# Patient Record
Sex: FEMALE | Race: WHITE | Employment: UNEMPLOYED | URBAN - NONMETROPOLITAN AREA
[De-identification: names, ages, dates, MRNs, and addresses within clinical notes are randomized per-mention and may not be internally consistent; named-entity substitution may affect disease eponyms.]

---

## 2017-04-24 ENCOUNTER — OFFICE VISIT (OUTPATIENT)
Dept: NEUROLOGY | Age: 32
End: 2017-04-24
Payer: COMMERCIAL

## 2017-04-24 VITALS
HEIGHT: 66 IN | BODY MASS INDEX: 27.64 KG/M2 | SYSTOLIC BLOOD PRESSURE: 112 MMHG | WEIGHT: 172 LBS | DIASTOLIC BLOOD PRESSURE: 72 MMHG

## 2017-04-24 DIAGNOSIS — M79.604 LEG PAIN, BILATERAL: Primary | ICD-10-CM

## 2017-04-24 DIAGNOSIS — G72.3 HYPOKALEMIC PERIODIC PARALYSIS: ICD-10-CM

## 2017-04-24 DIAGNOSIS — M79.605 LEG PAIN, BILATERAL: Primary | ICD-10-CM

## 2017-04-24 DIAGNOSIS — G89.29 CHRONIC LOW BACK PAIN, UNSPECIFIED BACK PAIN LATERALITY, WITH SCIATICA PRESENCE UNSPECIFIED: ICD-10-CM

## 2017-04-24 DIAGNOSIS — M54.5 CHRONIC LOW BACK PAIN, UNSPECIFIED BACK PAIN LATERALITY, WITH SCIATICA PRESENCE UNSPECIFIED: ICD-10-CM

## 2017-04-24 PROCEDURE — 99214 OFFICE O/P EST MOD 30 MIN: CPT | Performed by: PSYCHIATRY & NEUROLOGY

## 2017-04-24 RX ORDER — POTASSIUM CHLORIDE 20 MEQ/1
TABLET, EXTENDED RELEASE ORAL
Refills: 2 | COMMUNITY
Start: 2017-02-21

## 2017-04-24 RX ORDER — PREGABALIN 50 MG/1
CAPSULE ORAL
Qty: 42 CAPSULE | Refills: 0 | Status: SHIPPED | OUTPATIENT
Start: 2017-04-24

## 2017-04-24 RX ORDER — ACETAMINOPHEN 500 MG
500 TABLET ORAL EVERY 6 HOURS PRN
COMMUNITY

## 2017-04-24 RX ORDER — AZITHROMYCIN 250 MG/1
TABLET, FILM COATED ORAL
Refills: 0 | COMMUNITY
Start: 2017-02-21 | End: 2017-04-24 | Stop reason: ALTCHOICE

## 2017-04-24 RX ORDER — ALBUTEROL SULFATE 2.5 MG/3ML
SOLUTION RESPIRATORY (INHALATION)
Refills: 1 | COMMUNITY
Start: 2017-02-21

## 2017-04-24 RX ORDER — PREDNISONE 20 MG/1
TABLET ORAL
Refills: 0 | COMMUNITY
Start: 2017-02-21 | End: 2017-04-24 | Stop reason: ALTCHOICE

## 2017-05-08 ENCOUNTER — TELEPHONE (OUTPATIENT)
Dept: NEUROLOGY | Age: 32
End: 2017-05-08

## 2017-05-08 RX ORDER — SPIRONOLACTONE 25 MG/1
25 TABLET ORAL DAILY
Qty: 30 TABLET | Refills: 3 | Status: SHIPPED | OUTPATIENT
Start: 2017-05-08 | End: 2017-08-01 | Stop reason: SDUPTHER

## 2017-05-08 RX ORDER — PREGABALIN 50 MG/1
CAPSULE ORAL
Qty: 90 CAPSULE | Refills: 5 | Status: SHIPPED | OUTPATIENT
Start: 2017-05-08 | End: 2017-05-11 | Stop reason: SDUPTHER

## 2017-05-11 ENCOUNTER — TELEPHONE (OUTPATIENT)
Dept: NEUROLOGY | Age: 32
End: 2017-05-11

## 2017-05-11 RX ORDER — PREGABALIN 50 MG/1
CAPSULE ORAL
Qty: 90 CAPSULE | Refills: 5 | Status: SHIPPED | OUTPATIENT
Start: 2017-05-11

## 2017-05-12 ENCOUNTER — TELEPHONE (OUTPATIENT)
Dept: NEUROLOGY | Age: 32
End: 2017-05-12

## 2017-05-16 ENCOUNTER — HOSPITAL ENCOUNTER (OUTPATIENT)
Dept: MRI IMAGING | Age: 32
Discharge: HOME | End: 2017-05-16
Payer: COMMERCIAL

## 2017-05-16 ENCOUNTER — HOSPITAL ENCOUNTER (OUTPATIENT)
Dept: NEUROLOGY | Age: 32
Discharge: HOME | End: 2017-05-16
Payer: COMMERCIAL

## 2017-05-16 DIAGNOSIS — M79.604 LEG PAIN, BILATERAL: ICD-10-CM

## 2017-05-16 DIAGNOSIS — M79.605 LEG PAIN, BILATERAL: ICD-10-CM

## 2017-05-16 DIAGNOSIS — G89.29 CHRONIC LOW BACK PAIN, UNSPECIFIED BACK PAIN LATERALITY, WITH SCIATICA PRESENCE UNSPECIFIED: ICD-10-CM

## 2017-05-16 DIAGNOSIS — M54.5 CHRONIC LOW BACK PAIN, UNSPECIFIED BACK PAIN LATERALITY, WITH SCIATICA PRESENCE UNSPECIFIED: ICD-10-CM

## 2017-05-16 PROCEDURE — 95909 NRV CNDJ TST 5-6 STUDIES: CPT | Performed by: PSYCHIATRY & NEUROLOGY

## 2017-05-16 PROCEDURE — 95886 MUSC TEST DONE W/N TEST COMP: CPT | Performed by: PSYCHIATRY & NEUROLOGY

## 2017-05-16 PROCEDURE — 95886 MUSC TEST DONE W/N TEST COMP: CPT

## 2017-05-16 PROCEDURE — 72148 MRI LUMBAR SPINE W/O DYE: CPT

## 2017-05-16 PROCEDURE — 95909 NRV CNDJ TST 5-6 STUDIES: CPT

## 2017-05-18 ENCOUNTER — TELEPHONE (OUTPATIENT)
Dept: NEUROLOGY | Age: 32
End: 2017-05-18

## 2017-06-26 ENCOUNTER — TELEPHONE (OUTPATIENT)
Dept: NEUROLOGY | Age: 32
End: 2017-06-26

## 2017-08-02 RX ORDER — SPIRONOLACTONE 25 MG/1
25 TABLET ORAL 3 TIMES DAILY
Qty: 90 TABLET | Refills: 11 | Status: SHIPPED | OUTPATIENT
Start: 2017-08-02

## 2019-01-19 ENCOUNTER — HOSPITAL ENCOUNTER (OUTPATIENT)
Facility: HOSPITAL | Age: 34
Setting detail: OBSERVATION
Discharge: HOME | End: 2019-01-19
Attending: OBSTETRICS & GYNECOLOGY | Admitting: OBSTETRICS & GYNECOLOGY

## 2019-01-19 VITALS
BODY MASS INDEX: 31.48 KG/M2 | HEIGHT: 64 IN | SYSTOLIC BLOOD PRESSURE: 127 MMHG | WEIGHT: 184.4 LBS | HEART RATE: 97 BPM | DIASTOLIC BLOOD PRESSURE: 88 MMHG

## 2019-01-19 LAB
BACTERIA UR QL AUTO: ABNORMAL /HPF
BILIRUB UR QL STRIP: NEGATIVE
CLARITY UR: CLEAR
COLOR UR: YELLOW
GLUCOSE UR STRIP-MCNC: NEGATIVE MG/DL
HGB UR QL STRIP.AUTO: ABNORMAL
HYALINE CASTS UR QL AUTO: ABNORMAL /LPF
KETONES UR QL STRIP: ABNORMAL
LEUKOCYTE ESTERASE UR QL STRIP.AUTO: NEGATIVE
NITRITE UR QL STRIP: NEGATIVE
PH UR STRIP.AUTO: 6.5 [PH] (ref 5–8)
PROT UR QL STRIP: NEGATIVE
RBC # UR: ABNORMAL /HPF
REF LAB TEST METHOD: ABNORMAL
SP GR UR STRIP: 1.01 (ref 1–1.03)
SQUAMOUS #/AREA URNS HPF: ABNORMAL /HPF
UROBILINOGEN UR QL STRIP: ABNORMAL
WBC UR QL AUTO: ABNORMAL /HPF

## 2019-01-19 PROCEDURE — G0378 HOSPITAL OBSERVATION PER HR: HCPCS

## 2019-01-19 PROCEDURE — 81001 URINALYSIS AUTO W/SCOPE: CPT | Performed by: OBSTETRICS & GYNECOLOGY

## 2019-01-19 RX ORDER — ASCORBIC ACID 500 MG
500 TABLET ORAL DAILY
COMMUNITY

## 2019-01-21 PROBLEM — Z34.90 PREGNANCY: Status: ACTIVE | Noted: 2019-01-21

## 2022-02-28 ENCOUNTER — TRANSCRIBE ORDERS (OUTPATIENT)
Dept: ADMINISTRATIVE | Facility: HOSPITAL | Age: 37
End: 2022-02-28

## 2022-02-28 DIAGNOSIS — Z33.1 PREGNANT STATE, INCIDENTAL: Primary | ICD-10-CM

## 2022-09-22 ENCOUNTER — TRANSCRIBE ORDERS (OUTPATIENT)
Dept: PHYSICAL THERAPY | Facility: CLINIC | Age: 37
End: 2022-09-22

## 2022-09-22 DIAGNOSIS — M54.50 LOW BACK PAIN, UNSPECIFIED BACK PAIN LATERALITY, UNSPECIFIED CHRONICITY, UNSPECIFIED WHETHER SCIATICA PRESENT: Primary | ICD-10-CM

## 2022-10-03 ENCOUNTER — TREATMENT (OUTPATIENT)
Dept: PHYSICAL THERAPY | Facility: CLINIC | Age: 37
End: 2022-10-03

## 2022-10-03 DIAGNOSIS — M53.3 COCCYXDYNIA: ICD-10-CM

## 2022-10-03 DIAGNOSIS — N94.10 DYSPAREUNIA, FEMALE: ICD-10-CM

## 2022-10-03 DIAGNOSIS — R10.2 PELVIC PAIN: Primary | ICD-10-CM

## 2022-10-03 PROCEDURE — 97110 THERAPEUTIC EXERCISES: CPT | Performed by: PHYSICAL THERAPIST

## 2022-10-03 PROCEDURE — 97535 SELF CARE MNGMENT TRAINING: CPT | Performed by: PHYSICAL THERAPIST

## 2022-10-03 PROCEDURE — 97162 PT EVAL MOD COMPLEX 30 MIN: CPT | Performed by: PHYSICAL THERAPIST

## 2022-10-03 NOTE — PROGRESS NOTES
Physical Therapy Initial Evaluation and Plan of Care    Patient: Alaina Brown              : 1985  Today's Date: 10/3/2022  Referring practitioner: Moncho Spencer,*  Date of Initial Visit: 10/3/2022  Patient seen for 1 sessions    Visit Diagnoses:    ICD-10-CM ICD-9-CM   1. Pelvic pain  R10.2 [id number]   2. Coccyxdynia  M53.3 724.79   3. Dyspareunia, female  N94.10 625.0     Past Medical History:   Diagnosis Date   • Asthma    • Hypokalemic periodic paralysis    • MTHFR deficiency complicating pregnancy, third trimester (CMS/HCC)    • PCOS (polycystic ovarian syndrome)    • Rheumatoid arthritis (CMS/[facility])      Past Surgical History:   Procedure Laterality Date   • D&C HYSTEROSCOPY LAPAROSCOPY N/A 2016    Procedure: DIAGNOSTIC LAPROSCOPY, DILATION & CURETTAGE, HYSTEROSCOPY;  Surgeon: Abiola Hancock DO;  Location: [facility] OR;  Service:    • WISDOM TOOTH EXTRACTION         SUBJECTIVE     Subjective She reports tailbone, pelvic pain and painful intercourse. Her first delivery was forcep delivery in  with grade 3 tearing. Most recent delivery was water/home birth with grade 2 tear. Has difficulty emptying bladder/starting stream ,espeically at the end of the day. She does have chronic back pain and difficulty sleeping. Reports of cystocele per midwife she sees.        Subjective      Chief Complaint: No chief complaint on file.         OBJECTIVE     Objective   Objective          Passive Hip ROM    Hip IR  Hip ER   Right 24 Right 37   Left 20 Left 40     Hip Abduction Strength (Glut Med): R 4- L 4-     SLR/TA contraction: decreased core stability noted with bed mobility. Stands with deconditioned core and slight anterior rib flare.   SLS R <5 seconds L <5 seconds  Therapeutic Exercises    85327 Comments   Pillow prop    Breathing     Happy baby stretch    Reviewed chiropractic stretches    TrA     Timed Minutes 15         Therapy  Education/Self Care 42870   Education offered today POC/anatomy, PFM    Lani Code [id number]   Ongoing HEP   New   Timed Minutes 10       Total Timed Treatment:     25   mins  Total Time of Visit:            65   mins    ASSESSMENT/PLAN      Goals                                                                           Progress Note due by 11/2/22                                                                                        Rehab due by 12/31/22                STG by: 6 weeks Comments Status   Pt will demonstrate proper PF 3+/5 or greater and TA activation  And relaxation in supine in order to progress with more functional strengthening.      Pt will be independent with initial HEP, in order to accelerate progress.        Pt will be able to empty bladder without hesitancy for 1 week.              LTG by: 12 weeks      Pt will be independent with HEP including core, hip and PF strengthening.       Pt will demonstrate 4+/5 or greater hip abduction strength.        Pt will be able to go 1 full day working at home without a increase in pelvic pain/pressure.       Pt will report 0-1 episodes of UI in a months' time.        Pt will report 0-1/3 on Marinoff scale.         Pt will perform SLS (B) for 10 seconds or greater with proper stability.               Assessment & Plan     Assessment  Impairments: abnormal or restricted ROM, activity intolerance, impaired balance, impaired physical strength, lacks appropriate home exercise program and pain with function  Functional Limitations: carrying objects, lifting, sleeping, walking, pushing, uncomfortable because of pain, moving in bed, sitting and standing  Assessment details: Alaina presents with long history of pelvic pain, including pain with intercourse and prolonged activities. She does have chronic low back pain that started with a fall onto her tailbone as a child.  She has urine hesitancy and difficulty emptying as well as pressure that happens more at  the end of the day. Upon assessment today she had decreased rib expansion with breathing, poor hip/core stability, decreased hip PROM, all placing increased tension on pelvic floor and causing pain with activity. Skilled PT is needed to address soft tissue and joint restrictions and progress towards independent HEP. She is very motivated and I believe she will progress very well with physical therapy. Thank you for the referral.     Prognosis: good    Plan  Therapy options: will be seen for skilled therapy services  Planned modality interventions: TENS, dry needling, low level laser therapy and electrical stimulation/Russian stimulation  Planned therapy interventions: manual therapy, motor coordination training, neuromuscular re-education, postural training, soft tissue mobilization, spinal/joint mobilization, strengthening, stretching, therapeutic activities, IADL retraining, home exercise program, gait training, functional ROM exercises, ADL retraining, abdominal trunk stabilization, body mechanics training, balance/weight-bearing training, flexibility, transfer training and joint mobilization  Frequency: 1x week  Duration in weeks: 16  Treatment plan discussed with: patient  Plan details: We will initially work on hip mobility and surrounding soft tissue restrictions. We will then progress with internal pelvic floor work. We will use sEMG/biofeedback for pelvic floor relaxation and strengthening. We will then progress with core/hip strengthening and towards independent HEP.           SIGNATURE: Elisabeth Cohen PT DPT, [place] License #: [id number]  Electronically Signed on 10/3/2022    Initial Certification  Certification Period: 10/3/2022 through 12/31/2022  I certify that the therapy services are furnished while this patient is under my care.  The services outlined above are required by this patient, and will be reviewed every 90 days.     PHYSICIAN: Moncho Spencer MD (NPI:  [id number])    Signature:________________________________________DATE: _________    Please sign and return via fax to [phone].   Thank you so much for letting us work with Alaina. I appreciate your letting us work with your patients. If you have any questions or concerns, please don't hesitate to contact me.          [address] [address]  [phone]

## 2022-10-14 ENCOUNTER — TREATMENT (OUTPATIENT)
Dept: PHYSICAL THERAPY | Facility: CLINIC | Age: 37
End: 2022-10-14

## 2022-10-14 DIAGNOSIS — R10.2 PELVIC PAIN: Primary | ICD-10-CM

## 2022-10-14 DIAGNOSIS — M53.3 COCCYXDYNIA: ICD-10-CM

## 2022-10-14 DIAGNOSIS — N94.10 DYSPAREUNIA, FEMALE: ICD-10-CM

## 2022-10-14 PROCEDURE — 97140 MANUAL THERAPY 1/> REGIONS: CPT | Performed by: PHYSICAL THERAPIST

## 2022-10-14 PROCEDURE — 97110 THERAPEUTIC EXERCISES: CPT | Performed by: PHYSICAL THERAPIST

## 2022-10-14 NOTE — PROGRESS NOTES
Physical Therapy Treatment Note  [address] [place] [address]    Patient: Alaina Brown                                                 Visit Date: 10/14/2022  :     1985    Referring practitioner:    Moncho Spencer,*  Date of Initial Visit:          Type: THERAPY  Noted: 10/3/2022    Patient seen for 2 sessions    Visit Diagnoses:    ICD-10-CM ICD-9-CM   1. Pelvic pain  R10.2 [id number]   2. Coccyxdynia  M53.3 724.79   3. Dyspareunia, female  N94.10 625.0     SUBJECTIVE     Subjective She had some aching pain with happy baby stretch, so she didn't push as far out, would have soreness after.     PAIN: 0/10         OBJECTIVE     Objective      Manual Therapy     62433  Comments   Prone STM with foam to thoracolumbar bruno and gluts    Prone hip IR/ER mobilizations with oscillations    Prone LS distraction    Prone external PF MFR    prone LAD (B)            Timed Minutes 40     Therapeutic Exercises    20712 Comments   TrA exhale in HL     TrA with Marlene    Reviewed/updated HEP    Modified adductor stretch, stop happy baby        Timed Minutes 15       Therapy Education/Self Care 34433   Education offered today    [facility] Code [id number]   Ongoing HEP      Timed Minutes        Total Timed Treatment:     55   mins  Total Time of Visit:             55   mins         ASSESSMENT/PLAN     GOALS  Goals                                                                           Progress Note due by 22                                                                                        Rehab due by 22                STG by: 6 weeks Comments Status   Pt will demonstrate proper PF 3+/5 or greater and TA activation  And relaxation in supine in order to progress with more functional strengthening.       Pt will be independent with initial HEP, in order to accelerate progress.  modified  met    Pt will be able to empty bladder  without hesitancy for 1 week.                LTG by: 12 weeks       Pt will be independent with HEP including core, hip and PF strengthening.        Pt will demonstrate 4+/5 or greater hip abduction strength.         Pt will be able to go 1 full day working at home without a increase in pelvic pain/pressure.        Pt will report 0-1 episodes of UI in a months' time.         Pt will report 0-1/3 on Marinoff scale.         Pt will perform SLS (B) for 10 seconds or greater with proper stability.                    Assessment/Plan     ASSESSMENT: We had to modified adductor stretch and stop happy baby secondary to pubic symphysis discomfort. She had a hard time coordinating TrA with marching, but with slow, controlled movement she was able.     PLAN: Progress with soft tissue and joint restrictions, work towards internal pelvic floor work. Progress with core/hip strengthening and SL stability.     SIGNATURE: Elisabeth Cohen, PT DPT, [place] License #: [id number]  Electronically Signed on 10/14/2022        [address] [place] [address] [place] [address]  [phone]

## 2022-11-03 ENCOUNTER — TREATMENT (OUTPATIENT)
Dept: PHYSICAL THERAPY | Facility: CLINIC | Age: 37
End: 2022-11-03

## 2022-11-03 DIAGNOSIS — R10.2 PELVIC PAIN: Primary | ICD-10-CM

## 2022-11-03 DIAGNOSIS — N94.10 DYSPAREUNIA, FEMALE: ICD-10-CM

## 2022-11-03 DIAGNOSIS — M53.3 COCCYXDYNIA: ICD-10-CM

## 2022-11-03 PROCEDURE — 97112 NEUROMUSCULAR REEDUCATION: CPT | Performed by: PHYSICAL THERAPIST

## 2022-11-03 PROCEDURE — 97110 THERAPEUTIC EXERCISES: CPT | Performed by: PHYSICAL THERAPIST

## 2022-11-03 PROCEDURE — 97140 MANUAL THERAPY 1/> REGIONS: CPT | Performed by: PHYSICAL THERAPIST

## 2022-11-03 NOTE — PROGRESS NOTES
Physical Therapy 30 Day Progress Note  [address] [place] [address]    Patient: Alaina Brown                                                 Visit Date: 11/3/2022  :     1985    Referring practitioner:    Moncho Spencer,*  Date of Initial Visit:          Type: THERAPY  Noted: 10/3/2022    Patient seen for 3 sessions    Visit Diagnoses:    ICD-10-CM ICD-9-CM   1. Pelvic pain  R10.2 [id number]   2. Coccyxdynia  M53.3 724.79   3. Dyspareunia, female  N94.10 625.0     SUBJECTIVE     Subjective She was having trouble doing leg lifts, but stopped lifting as high and that has help. She is still having back pain, but weather changes do not help.     PAIN: 0/10         OBJECTIVE     Objective      Manual Therapy     41384  Comments   Prone STM with foam to thoracolumbar bruno and gluts    Prone hip IR/ER mobilizations with oscillations    Prone LS distraction    Prone external PF MFR    prone LAD (B)            Timed Minutes 25     Therapeutic Exercises    54196 Comments   HL hip adduction + TrA + exhale x 10    HL ball press + exhale x 10    HL modified deadbug with ball     Discussed HEP         Timed Minutes 20     Neuromuscular Reeducation     76600 Comments   Sitting on SB   Breathing x 3  TrA x 3  Marches 2 X 20                     Timed Minutes 10       Therapy Education/Self Care 81018   Education offered today    Lani Code [id number]   Ongoing HEP      Timed Minutes        Total Timed Treatment:     55   mins  Total Time of Visit:             55   mins         ASSESSMENT/PLAN     GOALS  Goals                                                                           Progress Note due by 12/3/22                                                                                        Rehab due by 22                STG by: 6 weeks Comments Status   Pt will demonstrate proper PF 3+/5 or greater and TA activation  And relaxation  in supine in order to progress with more functional strengthening.  did not assess ongoing   Pt will be independent with initial HEP, in order to accelerate progress.  modified  met    Pt will be able to empty bladder without hesitancy for 1 week.   getting better; difficult in the evenings still  ongoing           LTG by: 12 weeks       Pt will be independent with HEP including core, hip and PF strengthening.   progressing ongoing   Pt will demonstrate 4+/5 or greater hip abduction strength.    ongoing ongoing   Pt will be able to go 1 full day working at home without a increase in pelvic pain/pressure.   increased at evening ongoing   Pt will report 0-1 episodes of UI in a months' time.   ongoing ongoing    Pt will report 0-1/3 on Marinoff scale.   ongoing ongoing    Pt will perform SLS (B) for 10 seconds or greater with proper stability.   ongoing ongoing             Assessment & Plan     Assessment  Impairments: abnormal or restricted ROM, activity intolerance, impaired balance, impaired physical strength, lacks appropriate home exercise program and pain with function  Functional Limitations: carrying objects, lifting, sleeping, walking, pushing, uncomfortable because of pain, moving in bed, sitting and standing  Assessment details:     Prognosis: good    Plan  Therapy options: will be seen for skilled therapy services  Planned modality interventions: TENS, dry needling, low level laser therapy and electrical stimulation/Russian stimulation  Planned therapy interventions: manual therapy, motor coordination training, neuromuscular re-education, postural training, soft tissue mobilization, spinal/joint mobilization, strengthening, stretching, therapeutic activities, IADL retraining, home exercise program, gait training, functional ROM exercises, ADL retraining, abdominal trunk stabilization, body mechanics training, balance/weight-bearing training, flexibility, transfer training and joint mobilization  Frequency:  1x week  Duration in weeks: 16  Treatment plan discussed with: patient  Plan details:            ASSESSMENT: She reports some improvements in emptying bladder, but ongoing. She still has back pain and UI. This is her 2nd treatment session since evaluation secondary to illness of therapist and scheduling.     PLAN: Consider laserstim to low back/tailbone.Progress with soft tissue and joint restrictions, work towards internal pelvic floor work. Progress with core/hip strengthening and SL stability.     SIGNATURE: Elisabeth Cohen, PT DPT, [place] License #: [id number]  Electronically Signed on 11/3/2022        [address] [place] [address], [place] [address]  [phone]

## 2022-11-15 ENCOUNTER — TREATMENT (OUTPATIENT)
Dept: PHYSICAL THERAPY | Facility: CLINIC | Age: 37
End: 2022-11-15

## 2022-11-15 DIAGNOSIS — R10.2 PELVIC PAIN: Primary | ICD-10-CM

## 2022-11-15 DIAGNOSIS — N94.10 DYSPAREUNIA, FEMALE: ICD-10-CM

## 2022-11-15 DIAGNOSIS — M53.3 COCCYXDYNIA: ICD-10-CM

## 2022-11-15 PROCEDURE — 97140 MANUAL THERAPY 1/> REGIONS: CPT | Performed by: PHYSICAL THERAPIST

## 2022-11-15 PROCEDURE — 97110 THERAPEUTIC EXERCISES: CPT | Performed by: PHYSICAL THERAPIST

## 2022-11-15 PROCEDURE — 97032 APPL MODALITY 1+ESTIM EA 15: CPT | Performed by: PHYSICAL THERAPIST

## 2022-11-15 NOTE — PROGRESS NOTES
Physical Therapy 30 Day Progress Note  [address] [place] [address]    Patient: Alaina Brown                                                 Visit Date: 11/15/2022  :     1985    Referring practitioner:    Moncho Spencer,*  Date of Initial Visit:          Type: THERAPY  Noted: 10/3/2022    Patient seen for 4 sessions    Visit Diagnoses:    ICD-10-CM ICD-9-CM   1. Pelvic pain  R10.2 [id number]   2. Coccyxdynia  M53.3 724.79   3. Dyspareunia, female  N94.10 625.0     SUBJECTIVE     Subjective She feels like she is doing better overall, but anil has been hurting a little more with the change in weather. She is not getting as much rest as well, her baby is teething.     PAIN: 0/10         OBJECTIVE     Objective      Manual Therapy     80736  Comments   Prone STM with foam to thoracolumbar bruno and gluts    Prone hip IR/ER mobilizations with oscillations    Prone LS distraction    Prone external PF MFR    prone LAD (B)            Timed Minutes 25     Modalities Comments   laserstim  chronic inflammation       Minutes 10     Therapeutic Exercises    66970 Comments   Thoracic extension on towel in HL     HL hip adduction + mini bridge X 10    (B) UE ER for posture     Discussed HEP         Timed Minutes 15       Therapy Education/Self Care 98026   Education offered today    [employer] Code [email or web address]   Ongoing HEP   Access Code: [email or web address]  URL: [email or web address]  Date: 11/15/2022  Prepared by: Elisabeth Cohen    Exercises  Pillow Prop - 1-2 x daily  Hooklying Rib Cage Breathing - 2-3 x daily - 1-2 sets - 2-3 reps  Supine Transversus Abdominis Bracing - Hands on Stomach - 2-3 x daily - 2-3 sets - 5-10 reps  Supine Hip Adductor Stretch - 1 x daily - 3 sets - 30 seconds hold  Supine Core Control March - 1 x daily - 7 x weekly - 2 sets - 10 reps  Seated of Supine thoracic mobilization towel roll vertical - 1-2 x daily - 3-5  minutes hold  Shoulder External Rotation and Scapular Retraction - 2 x daily - 10 reps  Supine Bridge with Mini Swiss Ball Between Knees - 3 x weekly - 2 sets - 10 reps     Timed Minutes        Total Timed Treatment:     50   mins  Total Time of Visit:             55   mins         ASSESSMENT/PLAN     GOALS  Goals                                                                           Progress Note due by 12/3/22                                                                                        Rehab due by 12/31/22                STG by: 6 weeks Comments Status   Pt will demonstrate proper PF 3+/5 or greater and TA activation  And relaxation in supine in order to progress with more functional strengthening.  did not assess ongoing   Pt will be independent with initial HEP, in order to accelerate progress.  modified  met    Pt will be able to empty bladder without hesitancy for 1 week.   getting better; difficult in the evenings still  ongoing           LTG by: 12 weeks       Pt will be independent with HEP including core, hip and PF strengthening.   progressing ongoing   Pt will demonstrate 4+/5 or greater hip abduction strength.    ongoing ongoing   Pt will be able to go 1 full day working at home without a increase in pelvic pain/pressure.   increased at evening ongoing   Pt will report 0-1 episodes of UI in a months' time.   ongoing ongoing    Pt will report 0-1/3 on Marinoff scale.   ongoing ongoing    Pt will perform SLS (B) for 10 seconds or greater with proper stability.   ongoing ongoing             Assessment/Plan     ASSESSMENT: She had increased tailbone/back pain with weather change. She also is having some postural deficits causing pain. We reviewed posture with nursing and ways to improve.     PLAN: Progress note next visit. Continue to use laserstim to low back/tailbone.Progress with soft tissue and joint restrictions, work towards internal pelvic floor work. Progress with core/hip  strengthening and SL stability.     SIGNATURE: Elisabeth Cohen, PT DPT, [place] License #: [id number]  Electronically Signed on 11/15/2022        [address] [place] [address]  [phone]

## 2022-12-01 ENCOUNTER — TREATMENT (OUTPATIENT)
Dept: PHYSICAL THERAPY | Facility: CLINIC | Age: 37
End: 2022-12-01

## 2022-12-01 DIAGNOSIS — M53.3 COCCYXDYNIA: ICD-10-CM

## 2022-12-01 DIAGNOSIS — R10.2 PELVIC PAIN: Primary | ICD-10-CM

## 2022-12-01 DIAGNOSIS — N94.10 DYSPAREUNIA, FEMALE: ICD-10-CM

## 2022-12-01 PROCEDURE — 97110 THERAPEUTIC EXERCISES: CPT | Performed by: PHYSICAL THERAPIST

## 2022-12-01 PROCEDURE — 97140 MANUAL THERAPY 1/> REGIONS: CPT | Performed by: PHYSICAL THERAPIST

## 2022-12-01 PROCEDURE — 97112 NEUROMUSCULAR REEDUCATION: CPT | Performed by: PHYSICAL THERAPIST

## 2022-12-01 NOTE — PROGRESS NOTES
Physical Therapy 30 Day Progress Note  [address] [place] [address]    Patient: Alaina Brown                                                 Visit Date: 2022  :     1985    Referring practitioner:    Moncho Spencer,*  Date of Initial Visit:          Type: THERAPY  Noted: 10/3/2022    Patient seen for 5 sessions    Visit Diagnoses:    ICD-10-CM ICD-9-CM   1. Pelvic pain  R10.2 [id number]   2. Coccyxdynia  M53.3 724.79   3. Dyspareunia, female  N94.10 625.0     SUBJECTIVE     Subjective She feels like she is making progress. She did fall a couple days ago and hurt her R knee. Saw chiropractor and did help. Her cycle is late. She did notice improved bladder emptying prior to the last couple days.     PAIN: 0/10         OBJECTIVE     Objective      Manual Therapy     04854  Comments   Prone STM with foam to thoracolumbar bruno and gluts    Prone hip IR/ER mobilizations with oscillations    Prone LS distraction    Prone external PF MFR    prone LAD (B)            Timed Minutes 25       Therapeutic Exercises    27762 Comments       HL hip adduction + mini bridge X 10    Standing hip abduction 2 x 10 each   Discussed HEP         Timed Minutes 18     Neuromuscular Reeducation     89663 Comments   Standing on 1/2 foam hip abduction  2 x 10    Sitting on ball  Marches  LAQ                 Timed Minutes 10     Therapy Education/Self Care 59814   Education offered today    [employer] Code [email or web address]   Ongoing HEP   Access Code: [email or web address]  URL: [email or web address]  Date: 11/15/2022  Prepared by: Elisabeth Cohen    Exercises  Pillow Prop - 1-2 x daily  Hooklying Rib Cage Breathing - 2-3 x daily - 1-2 sets - 2-3 reps  Supine Transversus Abdominis Bracing - Hands on Stomach - 2-3 x daily - 2-3 sets - 5-10 reps  Supine Hip Adductor Stretch - 1 x daily - 3 sets - 30 seconds hold  Supine Core Control March -  x daily - 7 x weekly - 2 sets  - 10 reps  Seated of Supine thoracic mobilization towel roll vertical - 1-2 x daily - 3-5 minutes hold  Shoulder External Rotation and Scapular Retraction - 2 x daily - 10 reps  Supine Bridge with Mini Swiss Ball Between Knees - 3 x weekly - 2 sets - 10 reps     Timed Minutes        Total Timed Treatment:     53   mins  Total Time of Visit:             55   mins         ASSESSMENT/PLAN     GOALS  Goals                                                                           Progress Note due by 12/31/22                                                                                        Rehab due by 12/31/22                STG by: 6 weeks Comments Status   Pt will demonstrate proper PF 3+/5 or greater and TA activation  And relaxation in supine in order to progress with more functional strengthening.  did not assess ongoing   Pt will be independent with initial HEP, in order to accelerate progress.  modified  met    Pt will be able to empty bladder without hesitancy for 1 week.   improved, but had increased with time around cycle  ongoing           LTG by: 12 weeks       Pt will be independent with HEP including core, hip and PF strengthening.   progressing ongoing   Pt will demonstrate 4+/5 or greater hip abduction strength.    ongoing ongoing   Pt will be able to go 1 full day working at home without a increase in pelvic pain/pressure.   ongoing increase at end of the day, slightly less than initially  ongoing   Pt will report 0-1 episodes of UI in a months' time.   none MET    Pt will report 0-1/3 on Marinoff scale.   ongoing ongoing    Pt will perform SLS (B) for 10 seconds or greater with proper stability.   L 10 seconds prior to decreased stabiltiy   R < 3 seconds (injured knee a couple days ago) Partially met             Assessment & Plan     Assessment  Impairments: abnormal or restricted ROM, activity intolerance, impaired balance, impaired physical strength, lacks appropriate home exercise program and  pain with function  Functional Limitations: carrying objects, lifting, sleeping, walking, pushing, uncomfortable because of pain, moving in bed, sitting and standing  Assessment details:     Prognosis: good    Plan  Therapy options: will be seen for skilled therapy services  Planned modality interventions: TENS, dry needling, low level laser therapy and electrical stimulation/Russian stimulation  Planned therapy interventions: manual therapy, motor coordination training, neuromuscular re-education, postural training, soft tissue mobilization, spinal/joint mobilization, strengthening, stretching, therapeutic activities, IADL retraining, home exercise program, gait training, functional ROM exercises, ADL retraining, abdominal trunk stabilization, body mechanics training, balance/weight-bearing training, flexibility, transfer training and joint mobilization  Frequency: 1x week  Duration in weeks: 16  Treatment plan discussed with: patient         ASSESSMENT: She Is reporting and showing some improvements. She has ongoing pressure and pain, more so at the end of the day and with prolonged sitting. She has not had any UI, but has ongoing hip/core weakness and limited with SLS.     PLAN:  Continue to use laserstim to low back/tailbone, once cycle comes . Progress with soft tissue and joint restrictions, work towards internal pelvic floor work. Progress with core/hip strengthening and SL stability.     SIGNATURE: Elisabeth Cohen, PT DPT, [place] License #: [id number]  Electronically Signed on 12/1/2022        [place] [address]  [phone]

## 2022-12-08 ENCOUNTER — TREATMENT (OUTPATIENT)
Dept: PHYSICAL THERAPY | Facility: CLINIC | Age: 37
End: 2022-12-08

## 2022-12-08 DIAGNOSIS — M53.3 COCCYXDYNIA: ICD-10-CM

## 2022-12-08 DIAGNOSIS — R10.2 PELVIC PAIN: Primary | ICD-10-CM

## 2022-12-08 DIAGNOSIS — N94.10 DYSPAREUNIA, FEMALE: ICD-10-CM

## 2022-12-08 PROCEDURE — 97112 NEUROMUSCULAR REEDUCATION: CPT | Performed by: PHYSICAL THERAPIST

## 2022-12-08 PROCEDURE — 97140 MANUAL THERAPY 1/> REGIONS: CPT | Performed by: PHYSICAL THERAPIST

## 2022-12-08 PROCEDURE — 97032 APPL MODALITY 1+ESTIM EA 15: CPT | Performed by: PHYSICAL THERAPIST

## 2022-12-08 PROCEDURE — 97110 THERAPEUTIC EXERCISES: CPT | Performed by: PHYSICAL THERAPIST

## 2022-12-08 NOTE — PROGRESS NOTES
Physical Therapy Treatment Note  [address] [place] [address]    Patient: Alaina Brown                                                 Visit Date: 2022  :     1985    Referring practitioner:    Moncho Spencer,*  Date of Initial Visit:          Type: THERAPY  Noted: 10/3/2022     Patient seen for 6 sessions    Visit Diagnoses:    ICD-10-CM ICD-9-CM   1. Pelvic pain  R10.2 [id number]   2. Coccyxdynia  M53.3 724.79   3. Dyspareunia, female  N94.10 625.0     SUBJECTIVE     Subjective Her cycle came next day after appointment.       PAIN: 0/10         OBJECTIVE     Objective      Manual Therapy     25864  Comments   Prone STM with foam to thoracolumbar bruno and gluts    Prone hip IR/ER mobilizations with oscillations    Prone LS distraction    Prone external PF MFR    prone LAD (B)            Timed Minutes 20       Therapeutic Exercises    96425 Comments   HL opposite arm/leg ball press + exhale/TrA X 10    Seated ball press opposite arm/leg + exhale Tra X 10   Serratus wall slides with foam  2 x 10    Discussed HEP         Timed Minutes 18     Modalities Comments   laserstim  chronic inflammation         Minutes 10        Neuromuscular Reeducation     03968 Comments   Standing on 1/2 foam hip abduction   x 10   Tandem stance on 1/2 foam                 Timed Minutes 10     Therapy Education/Self Care 60685   Education offered today    [employer] Code [email or web address]   Ongoing HEP   Access Code: [email or web address]  URL: [email or web address]  Date: 2022  Prepared by: Elisabeth Cohen    Exercises  Pillow Prop - 1-2 x daily  Hooklying Rib Cage Breathing - 2-3 x daily - 1-2 sets - 2-3 reps  Supine Transversus Abdominis Bracing - Hands on Stomach - 2-3 x daily - 2-3 sets - 5-10 reps  Supine Hip Adductor Stretch - 1 x daily - 3 sets - 30 seconds hold  Supine Core Control March - 1 x daily - 7 x weekly - 2 sets - 10 reps  Seated of Supine  thoracic mobilization towel roll vertical - 1-2 x daily - 3-5 minutes hold  Shoulder External Rotation and Scapular Retraction - 2 x daily - 10 reps  Supine Bridge with Mini Swiss Ball Between Knees - 3 x weekly - 2 sets - 10 reps  Standing Hip Abduction with Counter Support - 3 x weekly - 2 sets - 10-15 reps  Serratus Activation at Wall - 3 x weekly - 2 sets - 10 reps  Tandem Stance with Support - 3 x weekly - 2 sets - 30 seconds hold       Timed Minutes        Total Timed Treatment:     58   mins  Total Time of Visit:             58   mins         ASSESSMENT/PLAN     GOALS  Goals                                                                           Progress Note due by 12/31/22                                                                                        Rehab due by 12/31/22                STG by: 6 weeks Comments Status   Pt will demonstrate proper PF 3+/5 or greater and TA activation  And relaxation in supine in order to progress with more functional strengthening.  did not assess ongoing   Pt will be independent with initial HEP, in order to accelerate progress.  modified  met    Pt will be able to empty bladder without hesitancy for 1 week.   improved, but had increased with time around cycle  ongoing           LTG by: 12 weeks       Pt will be independent with HEP including core, hip and PF strengthening.   progressing ongoing   Pt will demonstrate 4+/5 or greater hip abduction strength.    ongoing ongoing   Pt will be able to go 1 full day working at home without a increase in pelvic pain/pressure.   ongoing increase at end of the day, slightly less than initially  ongoing   Pt will report 0-1 episodes of UI in a months' time.   none MET    Pt will report 0-1/3 on Marinoff scale.   ongoing ongoing    Pt will perform SLS (B) for 10 seconds or greater with proper stability.   L 10 seconds prior to decreased stabiltiy   R < 3 seconds (injured knee a couple days ago) Partially met          Assessment/Plan     ASSESSMENT: She has ongoing core/hip and balance deficits causing strain on lower back and tailbone     PLAN:  Continue to use laserstim to low back/tailbone. Progress with soft tissue and joint restrictions, work towards internal pelvic floor work, consider biofeedback next visit. Progress with core/hip strengthening and SL stability.     SIGNATURE: Elisabeth Cohen, PT DPT, [place] License #: [id number]  Electronically Signed on 12/8/2022        [address] [place] [address] [place] [address]  [phone]

## 2022-12-15 ENCOUNTER — TREATMENT (OUTPATIENT)
Dept: PHYSICAL THERAPY | Facility: CLINIC | Age: 37
End: 2022-12-15

## 2022-12-15 DIAGNOSIS — M53.3 COCCYXDYNIA: Primary | ICD-10-CM

## 2022-12-15 DIAGNOSIS — N94.10 DYSPAREUNIA, FEMALE: ICD-10-CM

## 2022-12-15 DIAGNOSIS — R10.2 PELVIC PAIN: ICD-10-CM

## 2022-12-15 PROCEDURE — 97112 NEUROMUSCULAR REEDUCATION: CPT | Performed by: PHYSICAL THERAPIST

## 2022-12-15 PROCEDURE — 97140 MANUAL THERAPY 1/> REGIONS: CPT | Performed by: PHYSICAL THERAPIST

## 2022-12-15 PROCEDURE — 97032 APPL MODALITY 1+ESTIM EA 15: CPT | Performed by: PHYSICAL THERAPIST

## 2022-12-15 PROCEDURE — 97110 THERAPEUTIC EXERCISES: CPT | Performed by: PHYSICAL THERAPIST

## 2022-12-15 NOTE — PROGRESS NOTES
Physical Therapy Treatment Note  [address] [place] [address]    Patient: Alaina Brown                                                 Visit Date: 12/15/2022  :     1985    Referring practitioner:    Moncho Spencer,*  Date of Initial Visit:          Type: THERAPY  Noted: 10/3/2022     Patient seen for 7 sessions    Visit Diagnoses:    ICD-10-CM ICD-9-CM   1. Coccyxdynia  M53.3 724.79   2. Pelvic pain  R10.2 [id number]   3. Dyspareunia, female  N94.10 625.0     SUBJECTIVE     Subjective She feels like she is getting better with pain. She's had a sick child and not sleeping well, so hasn't been able to do HEP as often. She had some tailbone pain with rocking son. She still has pain with prolonged sitting, she can sit longer without irritation. She has some UI with carrying/walking with child, prolonged. She doesn't have inflammation with nursing child anymore.     PAIN: 0/10         OBJECTIVE     Objective      Manual Therapy     87455  Comments   Prone STM with foam to thoracolumbar bruno and gluts    Prone hip IR/ER mobilizations with oscillations    Prone LS distraction    Prone external PF MFR    prone LAD (B)            Timed Minutes 15       Therapeutic Exercises    73744 Comments               Discussed HEP /updated        Timed Minutes 10     Modalities Comments   laserstim  chronic inflammation         Minutes 10      sEMG Biofeedback (36931) Activity Position Resting Tone (mV) Sets/reps Time (sec) Comments   PFM contraction/relaxation R SL 2-3 2  5 x 3 4-5  0 10-15   PFM contraction/relaxation sitting < 2 2  5 2-3  0 10-15 mv max                Timed Minutes: 20       Therapy Education/Self Care 72469   Education offered today    [employer] Code [email or web address]   Ongoing HEP   Access Code: [email or web address]  URL: [email or web address]  Date: 2022  Prepared by: Elisabeth Cohen    Exercises  Pillow Prop - 1-2 x daily  Hooklying  Rib Cage Breathing - 2-3 x daily - 1-2 sets - 2-3 reps  Supine Transversus Abdominis Bracing - Hands on Stomach - 2-3 x daily - 2-3 sets - 5-10 reps  Supine Hip Adductor Stretch - 1 x daily - 3 sets - 30 seconds hold  Supine Core Control March - 1 x daily - 7 x weekly - 2 sets - 10 reps  Seated of Supine thoracic mobilization towel roll vertical - 1-2 x daily - 3-5 minutes hold  Shoulder External Rotation and Scapular Retraction - 2 x daily - 10 reps  Supine Bridge with Mini Swiss Ball Between Knees - 3 x weekly - 2 sets - 10 reps  Standing Hip Abduction with Counter Support - 3 x weekly - 2 sets - 10-15 reps  Serratus Activation at Wall - 3 x weekly - 2 sets - 10 reps  Tandem Stance with Support - 3 x weekly - 2 sets - 30 seconds hold       Timed Minutes        Total Timed Treatment:     55   mins  Total Time of Visit:             58   mins         ASSESSMENT/PLAN     GOALS  Goals                                                                           Progress Note due by 12/31/22                                                                                        Rehab due by 12/31/22                STG by: 6 weeks Comments Status   Pt will demonstrate proper PF 3+/5 or greater and TA activation  And relaxation in supine in order to progress with more functional strengthening. progressing Partially met   Pt will be independent with initial HEP, in order to accelerate progress.  modified  met    Pt will be able to empty bladder without hesitancy for 1 week.   improved, but had increased with time around cycle  ongoing           LTG by: 12 weeks       Pt will be independent with HEP including core, hip and PF strengthening.   progressing ongoing   Pt will demonstrate 4+/5 or greater hip abduction strength.    ongoing ongoing   Pt will be able to go 1 full day working at home without a increase in pelvic pain/pressure.   ongoing increase at end of the day, slightly less than initially  ongoing   Pt will report  0-1 episodes of UI in a months' time.   none MET    Pt will report 0-1/3 on Marinoff scale.   ongoing ongoing    Pt will perform SLS (B) for 10 seconds or greater with proper stability.   L 10 seconds prior to decreased stabiltiy   R < 3 seconds (injured knee a couple days ago) Partially met         Assessment/Plan     ASSESSMENT: She has decent control of her pelvic floor, but limited endurance. Her pain is improving with sitting, but still limited with prolonged sitting and standing. She also had slight increase in UI with standing/holding child while he was sick     PLAN:  Continue to use laserstim to low back/tailbone. Progress with soft tissue and joint restrictions, work towards internal pelvic floor work. Progress with core/hip strengthening and SL stability.     SIGNATURE: Elisabeth Cohen, PT DPT, [place] License #: [id number]  Electronically Signed on 12/15/2022        [address] [place] [address] [place] [address]  [phone]

## 2022-12-22 ENCOUNTER — TELEPHONE (OUTPATIENT)
Dept: PHYSICAL THERAPY | Facility: CLINIC | Age: 37
End: 2022-12-22

## 2022-12-22 NOTE — TELEPHONE ENCOUNTER
Caller: Obed Brown    Relationship: Emergency Contact       What was the call regarding: got the message about not coming in

## 2022-12-29 ENCOUNTER — TELEPHONE (OUTPATIENT)
Dept: PHYSICAL THERAPY | Facility: CLINIC | Age: 37
End: 2022-12-29

## 2023-01-05 ENCOUNTER — TELEPHONE (OUTPATIENT)
Dept: PHYSICAL THERAPY | Facility: CLINIC | Age: 38
End: 2023-01-05

## 2023-01-12 ENCOUNTER — TREATMENT (OUTPATIENT)
Dept: PHYSICAL THERAPY | Facility: CLINIC | Age: 38
End: 2023-01-12
Payer: COMMERCIAL

## 2023-01-12 DIAGNOSIS — R10.2 PELVIC PAIN: ICD-10-CM

## 2023-01-12 DIAGNOSIS — M53.3 COCCYXDYNIA: Primary | ICD-10-CM

## 2023-01-12 DIAGNOSIS — N94.10 DYSPAREUNIA, FEMALE: ICD-10-CM

## 2023-01-12 PROCEDURE — 97032 APPL MODALITY 1+ESTIM EA 15: CPT | Performed by: PHYSICAL THERAPIST

## 2023-01-12 PROCEDURE — 97110 THERAPEUTIC EXERCISES: CPT | Performed by: PHYSICAL THERAPIST

## 2023-01-12 PROCEDURE — 97140 MANUAL THERAPY 1/> REGIONS: CPT | Performed by: PHYSICAL THERAPIST

## 2023-01-12 NOTE — PROGRESS NOTES
Physical Therapy Treatment Note, 30 Day Progress Note and 90 Day Recertification Note  [address] [place] [address]    Patient: Alaina Brown                                                 Visit Date: 2023  :     1985    Referring practitioner:    Moncho Spencer,*  Date of Initial Visit:          Type: THERAPY  Noted: 10/3/2022     Patient seen for 8 sessions    Visit Diagnoses:    ICD-10-CM ICD-9-CM   1. Coccyxdynia  M53.3 724.79   2. Pelvic pain  R10.2 [id number]   3. Dyspareunia, female  N94.10 625.0     SUBJECTIVE     Subjective She got sick with coughing and had increased UI. Has been working more now, feeling like things are getting back on track. Her tailbone is slowly getting better, but still pain with sitting prolonged. Her knee is better. She does keeping having pain in her R shoulder blade area. She did slip on mariangel toy and fell last week.       PAIN: 0/10         OBJECTIVE     Objective      Manual Therapy     70865  Comments       Prone IR/ER mobilizations     Prone LS distraction    Prone external PF MFR    prone LAD (B)            Timed Minutes 15       Therapeutic Exercises    19587 Comments   Neck stretches            Discussed HEP /updated        Timed Minutes 15     Modalities Comments   laserstim  chronic inflammation         Minutes 10      Therapy Education/Self Care 51312   Education offered today    [employer] Code [email or web address]   Ongoing HEP   Access Code: [email or web address]  URL: [email or web address]  Date: 2022  Prepared by: Elisabeth Cohen    Exercises  Pillow Prop - 1-2 x daily  Hooklying Rib Cage Breathing - 2-3 x daily - 1-2 sets - 2-3 reps  Supine Transversus Abdominis Bracing - Hands on Stomach - 2-3 x daily - 2-3 sets - 5-10 reps  Supine Hip Adductor Stretch - 1 x daily - 3 sets - 30 seconds hold  Supine Core Control March -  x daily - 7 x weekly - 2 sets - 10 reps  Seated of Supine  thoracic mobilization towel roll vertical - 1-2 x daily - 3-5 minutes hold  Shoulder External Rotation and Scapular Retraction - 2 x daily - 10 reps  Supine Bridge with Mini Swiss Ball Between Knees - 3 x weekly - 2 sets - 10 reps  Standing Hip Abduction with Counter Support - 3 x weekly - 2 sets - 10-15 reps  Serratus Activation at Wall - 3 x weekly - 2 sets - 10 reps  Tandem Stance with Support - 3 x weekly - 2 sets - 30 seconds hold       Timed Minutes        Total Timed Treatment:     40   mins  Total Time of Visit:             42   mins         ASSESSMENT/PLAN     GOALS  Goals                                                                           Progress Note due by 2/11/23                                                                                        Rehab due by 4/11/23            STG by: 6 weeks Comments Status   Pt will demonstrate proper PF 3+/5 or greater and TA activation  And relaxation in supine in order to progress with more functional strengthening.  MET   Pt will be independent with initial HEP, in order to accelerate progress.  modified  met    Pt will be able to empty bladder without hesitancy for 1 week.   MET           LTG by: 12 weeks       Pt will be independent with HEP including core, hip and PF strengthening.   progressing ongoing   Pt will demonstrate 4+/5 or greater hip abduction strength.    ongoing ongoing   Pt will be able to go 1 full day working at home without a increase in pelvic pain/pressure.   increased with coughing and cycle- improved now and not constant ongoing   Pt will report 0-1 episodes of UI in a months' time.   none MET    Pt will report 0-1/3 on Marinoff scale.   ongoing ongoing    Pt will perform SLS (B) for 10 seconds or greater with proper stability.   L 10 seconds prior to decreased stabiltiy   R < 3 seconds (injured knee a couple days ago) Partially met         Assessment & Plan     Assessment  Impairments: abnormal or restricted ROM, activity  intolerance, impaired balance, impaired physical strength, lacks appropriate home exercise program and pain with function  Functional Limitations: carrying objects, lifting, sleeping, walking, pushing, uncomfortable because of pain, moving in bed, sitting and standing  Assessment details:     Prognosis: good    Plan  Therapy options: will be seen for skilled therapy services  Planned modality interventions: TENS, dry needling, low level laser therapy and electrical stimulation/Russian stimulation  Planned therapy interventions: manual therapy, motor coordination training, neuromuscular re-education, postural training, soft tissue mobilization, spinal/joint mobilization, strengthening, stretching, therapeutic activities, IADL retraining, home exercise program, gait training, functional ROM exercises, ADL retraining, abdominal trunk stabilization, body mechanics training, balance/weight-bearing training, flexibility, transfer training and joint mobilization  Frequency: 1x week  Duration in weeks: 12  Treatment plan discussed with: patient         ASSESSMENT: She has been sick with coughing and had increased UI during that period.She was limited with attendance secondary to weather and transportation. Overall her symptoms have improved, including sitting tolerance, UI and bladder emptying. He had fall over toys and injured RUE. She has ongoing UI, and tailbone pain with prolonged sitting.     PLAN:   Progress with soft tissue and joint restrictions, work towards internal pelvic floor work. Progress with core/hip strengthening and SL stability. Progress HEP.     SIGNATURE: Elisabeth Cohen, PT DPT, [place] License #: [id number]  Electronically Signed on 1/12/2023        Clinical Progress: improved  Home Program Compliance: Yes  Progress toward previous goals: Partially Met      90 Day Recertification  Certification Period: 1/12/23 through 4/11/23  I certify that the therapy services are furnished while this patient is under  my care.  The services outlined above are required by this patient, and will be reviewed every 90 days.     PHYSICIAN: Moncho Spencer MD (NPI: [id number])    Signature:___________________________________________DATE: _________    Please sign and return via fax to [phone].   Thank you so much for letting us work with Alaina. I appreciate your letting us work with your patients. If you have any questions or concerns, please don't hesitate to contact me.            [address] [place] [address]  [phone]

## 2023-01-19 ENCOUNTER — TREATMENT (OUTPATIENT)
Dept: PHYSICAL THERAPY | Facility: CLINIC | Age: 38
End: 2023-01-19
Payer: COMMERCIAL

## 2023-01-19 DIAGNOSIS — N94.10 DYSPAREUNIA, FEMALE: ICD-10-CM

## 2023-01-19 DIAGNOSIS — R10.2 PELVIC PAIN: ICD-10-CM

## 2023-01-19 DIAGNOSIS — M53.3 COCCYXDYNIA: Primary | ICD-10-CM

## 2023-01-19 PROCEDURE — 97140 MANUAL THERAPY 1/> REGIONS: CPT | Performed by: PHYSICAL THERAPIST

## 2023-01-19 PROCEDURE — 97110 THERAPEUTIC EXERCISES: CPT | Performed by: PHYSICAL THERAPIST

## 2023-01-19 NOTE — PROGRESS NOTES
Physical Therapy Treatment Note  [address], [place] [address]    Patient: Alaina Brown                                                 Visit Date: 2023  :     1985    Referring practitioner:    Moncho Spencer,*  Date of Initial Visit:          Type: THERAPY  Noted: 10/3/2022     Patient seen for 9 sessions    Visit Diagnoses:    ICD-10-CM ICD-9-CM   1. Coccyxdynia  M53.3 724.79   2. Pelvic pain  R10.2 [id number]   3. Dyspareunia, female  N94.10 625.0     SUBJECTIVE     Subjective she reports doing well, doing HEP daily. Her neck is still giving her issues. Tailbone pain is getting better.      PAIN: 0/10         OBJECTIVE     Objective      Manual Therapy     45928  Comments           Prone LS distraction    Prone external PF MFR                Timed Minutes 23     Therapeutic Exercises    78367 Comments   Bridge with hip adduction with ball x 10    standing hip abduction + exhale/TrA        Discussed HEP /updated        Timed Minutes 15     Therapy Education/Self Care 82411   Education offered today    [employer] Code [id number]   Ongoing HEP   Access Code: [email or web address]  URL: [email or web address]  Date: 2022  Prepared by: Elisabeth Cohen    Exercises  Pillow Prop - 1-2 x daily  Hooklying Rib Cage Breathing - 2-3 x daily - 1-2 sets - 2-3 reps  Supine Transversus Abdominis Bracing - Hands on Stomach - 2-3 x daily - 2-3 sets - 5-10 reps  Supine Hip Adductor Stretch - 1 x daily - 3 sets - 30 seconds hold  Supine Core Control March - 1 x daily - 7 x weekly - 2 sets - 10 reps  Seated of Supine thoracic mobilization towel roll vertical - 1-2 x daily - 3-5 minutes hold  Shoulder External Rotation and Scapular Retraction - 2 x daily - 10 reps  Supine Bridge with Mini Swiss Ball Between Knees - 3 x weekly - 2 sets - 10 reps  Standing Hip Abduction with Counter Support - 3 x weekly - 2 sets - 10-15 reps  Serratus Activation at  Wall - 3 x weekly - 2 sets - 10 reps  Tandem Stance with Support - 3 x weekly - 2 sets - 30 seconds hold       Timed Minutes      Total Timed Treatment:     38   mins  Total Time of Visit:             42   mins         ASSESSMENT/PLAN     GOALS  Goals                                                                           Progress Note due by 2/11/23                                                                                        Rehab due by 4/11/23            STG by: 6 weeks Comments Status   Pt will demonstrate proper PF 3+/5 or greater and TA activation  And relaxation in supine in order to progress with more functional strengthening.  MET   Pt will be independent with initial HEP, in order to accelerate progress.  modified  met    Pt will be able to empty bladder without hesitancy for 1 week.   MET           LTG by: 12 weeks       Pt will be independent with HEP including core, hip and PF strengthening.   progressing ongoing   Pt will demonstrate 4+/5 or greater hip abduction strength.    ongoing ongoing   Pt will be able to go 1 full day working at home without a increase in pelvic pain/pressure.   increased with coughing and cycle- improved now and not constant ongoing   Pt will report 0-1 episodes of UI in a months' time.   none MET    Pt will report 0-1/3 on Marinoff scale.   ongoing ongoing    Pt will perform SLS (B) for 10 seconds or greater with proper stability.   L 10 seconds prior to decreased stabiltiy   R < 3 seconds (injured knee a couple days ago) Partially met      Assessment & Plan            ASSESSMENT: She has been able to complete HEP daily and reports improvements. Her neck is still causing some pain and tailbone with sitting, but overall continues improvements. She has ongoing core/hip weakness, but this is improving as well.     PLAN:   Progress with postural, hip, core strengthening.     SIGNATURE: Elisabeth Cohen, PT DPT, [place] License #: [id number]  Electronically Signed on  1/19/2023        [address] [place] [address]. [address]  [phone]

## 2023-01-26 ENCOUNTER — TREATMENT (OUTPATIENT)
Dept: PHYSICAL THERAPY | Facility: CLINIC | Age: 38
End: 2023-01-26
Payer: COMMERCIAL

## 2023-01-26 DIAGNOSIS — M53.3 COCCYXDYNIA: Primary | ICD-10-CM

## 2023-01-26 DIAGNOSIS — N94.10 DYSPAREUNIA, FEMALE: ICD-10-CM

## 2023-01-26 DIAGNOSIS — R10.2 PELVIC PAIN: ICD-10-CM

## 2023-01-26 PROCEDURE — 97140 MANUAL THERAPY 1/> REGIONS: CPT | Performed by: PHYSICAL THERAPIST

## 2023-01-26 PROCEDURE — 97110 THERAPEUTIC EXERCISES: CPT | Performed by: PHYSICAL THERAPIST

## 2023-01-26 PROCEDURE — 97535 SELF CARE MNGMENT TRAINING: CPT | Performed by: PHYSICAL THERAPIST

## 2023-01-26 NOTE — PROGRESS NOTES
Physical Therapy Treatment Note  [address] [place] [address]    Patient: Alaina Brown                                                 Visit Date: 2023  :     1985    Referring practitioner:    Moncho Spencer,*  Date of Initial Visit:          Type: THERAPY  Noted: 10/3/2022     Patient seen for 10 sessions    Visit Diagnoses:    ICD-10-CM ICD-9-CM   1. Coccyxdynia  M53.3 724.79   2. Pelvic pain  R10.2 [id number]   3. Dyspareunia, female  N94.10 625.0     SUBJECTIVE     Subjective She noticed a little more tailbone pain, noticed all joints have been aching with weather change. Completing HEP without difficulty.       PAIN: 0/10         OBJECTIVE     Objective      Manual Therapy     43867  Comments           Prone LS distraction    Prone external PF MFR    Prone STM with foam to thorcolumbar bruno and gluts            Timed Minutes 23     Therapeutic Exercises    74747 Comments   SL hip adduction + exhale/TrA  x 10 each side            Discussed HEP /updated        Timed Minutes 10     Therapy Education/Self Care 31067   Education offered today Biofeedback next time   [employer] Code [id number]   Ongoing HEP   Access Code: [email or web address]  URL: [email or web address]  Date: 2022  Prepared by: Elisabeth Cohen    Exercises  Pillow Prop - 1-2 x daily  Hooklying Rib Cage Breathing - 2-3 x daily - 1-2 sets - 2-3 reps  Supine Transversus Abdominis Bracing - Hands on Stomach - 2-3 x daily - 2-3 sets - 5-10 reps  Supine Hip Adductor Stretch - 1 x daily - 3 sets - 30 seconds hold  Supine Core Control March - 1 x daily - 7 x weekly - 2 sets - 10 reps  Seated of Supine thoracic mobilization towel roll vertical - 1-2 x daily - 3-5 minutes hold  Shoulder External Rotation and Scapular Retraction - 2 x daily - 10 reps  Supine Bridge with Mini Swiss Ball Between Knees - 3 x weekly - 2 sets - 10 reps  Standing Hip Abduction with Counter  Support - 3 x weekly - 2 sets - 10-15 reps  Serratus Activation at Wall - 3 x weekly - 2 sets - 10 reps  Tandem Stance with Support - 3 x weekly - 2 sets - 30 seconds hold   Timed Minutes 10     Total Timed Treatment:     43   mins  Total Time of Visit:             45   mins         ASSESSMENT/PLAN     GOALS  Goals                                                                           Progress Note due by 2/11/23                                                                                        Rehab due by 4/11/23            STG by: 6 weeks Comments Status   Pt will demonstrate proper PF 3+/5 or greater and TA activation  And relaxation in supine in order to progress with more functional strengthening.  MET   Pt will be independent with initial HEP, in order to accelerate progress.  modified  met    Pt will be able to empty bladder without hesitancy for 1 week.   MET           LTG by: 12 weeks       Pt will be independent with HEP including core, hip and PF strengthening.   progressing ongoing   Pt will demonstrate 4+/5 or greater hip abduction strength.    ongoing ongoing   Pt will be able to go 1 full day working at home without a increase in pelvic pain/pressure.   increased with coughing and cycle- improved now and not constant ongoing   Pt will report 0-1 episodes of UI in a months' time.   none MET    Pt will report 0-1/3 on Marinoff scale.   ongoing ongoing    Pt will perform SLS (B) for 10 seconds or greater with proper stability.   L 10 seconds prior to decreased stabiltiy   R < 3 seconds (injured knee a couple days ago) Partially met      Assessment/Plan     ASSESSMENT: She had increase in pain, but overall improving. She does have ongoing hip/core weakness.     PLAN:   Progress HEP. Continue to progress with core, hip and pelvic stability.     SIGNATURE: Elisabeth Cohen, PT DPT, [place] License #: [id number]  Electronically Signed on 1/26/2023        [address] [place] [address]  [place]. [address]  [phone]

## 2023-02-07 ENCOUNTER — TELEPHONE (OUTPATIENT)
Dept: PHYSICAL THERAPY | Facility: CLINIC | Age: 38
End: 2023-02-07

## 2023-02-07 NOTE — TELEPHONE ENCOUNTER
Caller: Alaina Brown    Relationship: Self         What was the call regarding: SICK AND KIDS SICK

## 2023-02-21 ENCOUNTER — TREATMENT (OUTPATIENT)
Dept: PHYSICAL THERAPY | Facility: CLINIC | Age: 38
End: 2023-02-21
Payer: COMMERCIAL

## 2023-02-21 DIAGNOSIS — R10.2 PELVIC PAIN: ICD-10-CM

## 2023-02-21 DIAGNOSIS — M53.3 COCCYXDYNIA: Primary | ICD-10-CM

## 2023-02-21 DIAGNOSIS — N94.10 DYSPAREUNIA, FEMALE: ICD-10-CM

## 2023-02-21 PROCEDURE — 97032 APPL MODALITY 1+ESTIM EA 15: CPT | Performed by: PHYSICAL THERAPIST

## 2023-02-21 PROCEDURE — 97140 MANUAL THERAPY 1/> REGIONS: CPT | Performed by: PHYSICAL THERAPIST

## 2023-02-21 PROCEDURE — 97110 THERAPEUTIC EXERCISES: CPT | Performed by: PHYSICAL THERAPIST

## 2023-02-21 NOTE — PROGRESS NOTES
Physical Therapy Treatment Note and 30 Day Progress Note  [address] [place] [address]    Patient: Alaina Brown                                                 Visit Date: 2023  :     1985    Referring practitioner:    Moncho Spencer,*  Date of Initial Visit:          Type: THERAPY  Noted: 10/3/2022     Patient seen for 11 sessions    Visit Diagnoses:    ICD-10-CM ICD-9-CM   1. Coccyxdynia  M53.3 724.79   2. Pelvic pain  R10.2 [id number]   3. Dyspareunia, female  N94.10 625.0     SUBJECTIVE     Subjective She had been sick, had to be put on antibiotics. She coughed and felt like everything was sticking out/POP worsened. Feels like everything is flared again, but trying to do exercises. She's been having pressure with standing. Able to sit for shorter times, but increased time with prolonged sitting.     PAIN: 0/10         OBJECTIVE     Objective      Manual Therapy     04986  Comments           Prone LS distraction    Prone external PF MFR    Prone STM with foam to thorcolumbar bruno and gluts            Timed Minutes 23     Therapeutic Exercises    22519 Comments   Pillow prop    Pillow prop open books + exhale on the way back in  2 x 10       Discussed HEP /updated        Timed Minutes 10     Therapy Education/Self Care [id number]   Education offered today Biofeedback next time- focus on PFM/progressive strengthening   Lani Code [email or web address]   Ongoing HEP   Access Code: [email or web address]  URL: [email or web address]  Date: 2022  Prepared by: Elisabeth Cohen    Exercises  Pillow Prop - 1-2 x daily  Hooklying Rib Cage Breathing - 2-3 x daily - 1-2 sets - 2-3 reps  Supine Transversus Abdominis Bracing - Hands on Stomach - 2-3 x daily - 2-3 sets - 5-10 reps  Supine Hip Adductor Stretch - 1 x daily - 3 sets - 30 seconds hold  Supine Core Control March - 1 x daily - 7 x weekly - 2 sets - 10 reps  Seated of Supine thoracic  mobilization towel roll vertical - 1-2 x daily - 3-5 minutes hold  Shoulder External Rotation and Scapular Retraction - 2 x daily - 10 reps  Supine Bridge with Mini Swiss Ball Between Knees - 3 x weekly - 2 sets - 10 reps  Standing Hip Abduction with Counter Support - 3 x weekly - 2 sets - 10-15 reps  Serratus Activation at Wall - 3 x weekly - 2 sets - 10 reps  Tandem Stance with Support - 3 x weekly - 2 sets - 30 seconds hold   Timed Minutes 10     Total Timed Treatment:     43   mins  Total Time of Visit:             55   mins         ASSESSMENT/PLAN     GOALS  Goals                                                                           Progress Note due by 3/23/23                                                                                        Rehab due by 4/11/23            STG by: 6 weeks Comments Status   Pt will demonstrate proper PF 3+/5 or greater and TA activation  And relaxation in supine in order to progress with more functional strengthening.  MET   Pt will be independent with initial HEP, in order to accelerate progress.  modified  met    Pt will be able to empty bladder without hesitancy for 1 week.   MET           LTG by: 12 weeks       Pt will be independent with HEP including core, hip and PF strengthening.   progressing ongoing   Pt will demonstrate 4+/5 or greater hip abduction strength.    ongoing ongoing   Pt will be able to go 1 full day working at home without a increase in pelvic pain/pressure.   increased with coughing and cycle- improved now and not constant ongoing   Pt will report 0-1 episodes of UI in a months' time.   none MET    Pt will report 0-1/3 on Marinoff scale.   ongoing ongoing    Pt will perform SLS (B) for 10 seconds or greater with proper stability.   L 10 seconds prior to decreased stabiltiy   R < 3 seconds (injured knee a couple days ago) Partially met      Assessment & Plan     Assessment  Impairments: abnormal or restricted ROM, activity intolerance, impaired  balance, impaired physical strength, lacks appropriate home exercise program and pain with function  Functional Limitations: carrying objects, lifting, sleeping, walking, pushing, uncomfortable because of pain, moving in bed, sitting and standing  Assessment details:     Prognosis: good    Plan  Therapy options: will be seen for skilled therapy services  Planned modality interventions: TENS, dry needling, low level laser therapy and electrical stimulation/Russian stimulation  Planned therapy interventions: manual therapy, motor coordination training, neuromuscular re-education, postural training, soft tissue mobilization, spinal/joint mobilization, strengthening, stretching, therapeutic activities, IADL retraining, home exercise program, gait training, functional ROM exercises, ADL retraining, abdominal trunk stabilization, body mechanics training, balance/weight-bearing training, flexibility, transfer training and joint mobilization  Frequency: 1x week  Duration in weeks: 12  Treatment plan discussed with: patient         ASSESSMENT: She had flare after several bouts of illness. She has ongoing hip and core weakness placing increased strain on pelvic organs and lower back. She continues to have pain with prolonged sitting and increased pressure with standing.     PLAN:   Re-assess symptoms, progress with strengthening and updated HEP. Biofeedback next visit.     SIGNATURE: Elisabeth Cohen, PT DPT, [place] License #: [id number]  Electronically Signed on 2/21/2023        [place] [address]  [phone]

## 2023-03-09 ENCOUNTER — TREATMENT (OUTPATIENT)
Dept: PHYSICAL THERAPY | Facility: CLINIC | Age: 38
End: 2023-03-09
Payer: COMMERCIAL

## 2023-03-09 DIAGNOSIS — R10.2 PELVIC PAIN: ICD-10-CM

## 2023-03-09 DIAGNOSIS — N94.10 DYSPAREUNIA, FEMALE: ICD-10-CM

## 2023-03-09 DIAGNOSIS — M53.3 COCCYXDYNIA: Primary | ICD-10-CM

## 2023-03-09 PROCEDURE — 97112 NEUROMUSCULAR REEDUCATION: CPT | Performed by: PHYSICAL THERAPIST

## 2023-03-09 PROCEDURE — 97110 THERAPEUTIC EXERCISES: CPT | Performed by: PHYSICAL THERAPIST

## 2023-03-09 PROCEDURE — 97535 SELF CARE MNGMENT TRAINING: CPT | Performed by: PHYSICAL THERAPIST

## 2023-03-09 NOTE — PROGRESS NOTES
Physical Therapy Treatment Note  [address] [place] [address]    Patient: Alaina Brown                                                 Visit Date: 3/9/2023  :     1985    Referring practitioner:    Moncho Spencer,*  Date of Initial Visit:          Type: THERAPY  Noted: 10/3/2022     Patient seen for 12 sessions    Visit Diagnoses:    ICD-10-CM ICD-9-CM   1. Coccyxdynia  M53.3 724.79   2. Pelvic pain  R10.2 [id number]   3. Dyspareunia, female  N94.10 625.0     SUBJECTIVE     Subjective She says her POP is still in. She does feel like her muscles fatigue easily and stay tight. She is going her HEP consistently, but still feels tight all over.     PAIN: 0/10         OBJECTIVE     Objective        Therapeutic Exercises    98454 Comments   Cat/cow/mariangel pose    Thoracic extension in quadruped                Timed Minutes 10     sEMG Biofeedback (23886) Activity Position Resting Tone (mV) Sets/reps Time (sec) Comments           PFM contraction/relaxation sitting < 2 2  5 10  0 10-20 mv max                           Timed Minutes: 2        Therapy Education/Self Care [id number]   Education offered today Discussed blood work- updated HEP    [employer] Code [email or web address]   Ongoing HEP   Access Code: [email or web address]  URL: [email or web address]  Date: 2023  Prepared by: Elisabeth Cohen    Exercises  Cat Cow to Child's Pose - 1 x daily - 5 reps  Quadruped Thoracic Rotation Full Range with Hand on Neck - 1 x daily - 2 sets - 10 reps  Seated Pelvic Floor Contraction - 1-2 x daily  Pillow Prop - 1-2 x daily  Hooklying Rib Cage Breathing - 2-3 x daily - 1-2 sets - 2-3 reps  Supine Transversus Abdominis Bracing - Hands on Stomach - 2-3 x daily - 2-3 sets - 5-10 reps  Supine Hip Adductor Stretch - 1 x daily - 3 sets - 30 seconds hold  Supine Core Control March - 1 x daily - 7 x weekly - 2 sets - 10 reps  Seated of Supine thoracic mobilization towel  roll vertical - 1-2 x daily - 3-5 minutes hold  Shoulder External Rotation and Scapular Retraction - 2 x daily - 10 reps  Supine Bridge with Mini Swiss Ball Between Knees - 3 x weekly - 2 sets - 10 reps  Standing Hip Abduction with Counter Support - 3 x weekly - 2 sets - 10-15 reps  Serratus Activation at Wall - 3 x weekly - 2 sets - 10 reps  Tandem Stance with Support - 3 x weekly - 2 sets - 30 seconds hold  Seated Neck Sidebending Stretch - 1 x daily - 3 sets - 30 seconds hold  Gentle Levator Scapulae Stretch - 1 x daily - 3 sets - 30 seconds hold     Timed Minutes 10     Total Timed Treatment:     43   mins  Total Time of Visit:             48   mins         ASSESSMENT/PLAN     GOALS  Goals                                                                           Progress Note due by 3/23/23                                                                                        Rehab due by 4/11/23            STG by: 6 weeks Comments Status   Pt will demonstrate proper PF 3+/5 or greater and TA activation  And relaxation in supine in order to progress with more functional strengthening.  MET   Pt will be independent with initial HEP, in order to accelerate progress.  modified  met    Pt will be able to empty bladder without hesitancy for 1 week.   MET           LTG by: 12 weeks       Pt will be independent with HEP including core, hip and PF strengthening.   progressing ongoing   Pt will demonstrate 4+/5 or greater hip abduction strength.    ongoing ongoing   Pt will be able to go 1 full day working at home without a increase in pelvic pain/pressure.   increased with coughing and cycle- improved now and not constant ongoing   Pt will report 0-1 episodes of UI in a months' time.   none MET    Pt will report 0-1/3 on Marinoff scale.   ongoing ongoing    Pt will perform SLS (B) for 10 seconds or greater with proper stability.   L 10 seconds prior to decreased stabiltiy   R < 3 seconds (injured knee a couple days ago)  Partially met      Assessment/Plan     ASSESSMENT: She had improved PF endurance in sitting. At time she did need slight increase in time for relaxation between contractions. We did discuss blood work vs other causes of fatigue and tightness throughout body. I did update her HEP to work on spinal mobility.     PLAN:  Progress note next visit.  Re-assess symptoms, progress with strengthening and updated HEP. Progress with mobility and strengthening as able.     SIGNATURE: Elisabeth Cohen PT DPT, [place] License #: [id number]  Electronically Signed on 3/9/2023        [address] [place] [address], [place] [address]  [phone]

## 2023-03-23 ENCOUNTER — TELEPHONE (OUTPATIENT)
Dept: PHYSICAL THERAPY | Facility: CLINIC | Age: 38
End: 2023-03-23

## 2023-03-23 NOTE — TELEPHONE ENCOUNTER
Caller: Alaina Brown    Relationship: Self       What was the call regarding: HAS TO TAKE SISTER TO EMERGENCY DENTAL APPT

## 2023-04-06 ENCOUNTER — TREATMENT (OUTPATIENT)
Dept: PHYSICAL THERAPY | Facility: CLINIC | Age: 38
End: 2023-04-06
Payer: COMMERCIAL

## 2023-04-06 DIAGNOSIS — R10.2 PELVIC PAIN: ICD-10-CM

## 2023-04-06 DIAGNOSIS — N94.10 DYSPAREUNIA, FEMALE: ICD-10-CM

## 2023-04-06 DIAGNOSIS — M53.3 COCCYXDYNIA: Primary | ICD-10-CM

## 2023-04-06 PROCEDURE — 97110 THERAPEUTIC EXERCISES: CPT | Performed by: PHYSICAL THERAPIST

## 2023-04-06 PROCEDURE — 97140 MANUAL THERAPY 1/> REGIONS: CPT | Performed by: PHYSICAL THERAPIST

## 2023-04-06 NOTE — PROGRESS NOTES
Physical Therapy Treatment Note, 30 Day Progress Note and 90 Day Recertification Note  [address] [place] [address]    Patient: Alaina Brown                                                 Visit Date: 2023  :     1985    Referring practitioner:    Moncho Spencer,*  Date of Initial Visit:          Type: THERAPY  Noted: 10/3/2022     Patient seen for 13 sessions    Visit Diagnoses:    ICD-10-CM ICD-9-CM   1. Coccyxdynia  M53.3 724.79   2. Pelvic pain  R10.2 [id number]   3. Dyspareunia, female  N94.10 625.0     SUBJECTIVE     Subjective She overall feels much better. Pressure is back to prior to illness, some days it is worse than others. Tailbone has a little more aggravated, but not as severe as the beginning. Staying on top of stretches. [place] is better, has pain on her pubic symphysis, much better overall. Emptying bladder has resolved. She is sleeping better, did get a new mattress.     PAIN: 0/10         OBJECTIVE     Objective         Manual Therapy     97821  Comments               Prone LS distraction     Prone external PF MFR     Prone STM with foam to thorcolumbar bruno and gluts                 Timed Minutes 23      Therapeutic Exercises    14843 Comments   Sitting hip adduction + TrA     Sit to stand with hip adduction + exhale                Timed Minutes 15         Therapy Education/Self Care 75197   Education offered today     Medbride Code [email or web address]   Ongoing HEP   Access Code: [email or web address]  URL: [email or web address]  Date: 2023  Prepared by: Elisabeth Cohen    Exercises  Cat Cow to Child's Pose - 1 x daily - 5 reps  Quadruped Thoracic Rotation Full Range with Hand on Neck - 1 x daily - 2 sets - 10 reps  Seated Pelvic Floor Contraction - 1-2 x daily  Pillow Prop - 1-2 x daily  Hooklying Rib Cage Breathing - 2-3 x daily - 1-2 sets - 2-3 reps  Supine Transversus Abdominis Bracing - Hands on Stomach  - 2-3 x daily - 2-3 sets - 5-10 reps  Supine Hip Adductor Stretch - 1 x daily - 3 sets - 30 seconds hold  Supine Core Control March - 1 x daily - 7 x weekly - 2 sets - 10 reps  Seated of Supine thoracic mobilization towel roll vertical - 1-2 x daily - 3-5 minutes hold  Shoulder External Rotation and Scapular Retraction - 2 x daily - 10 reps  Supine Bridge with Mini Swiss Ball Between Knees - 3 x weekly - 2 sets - 10 reps  Standing Hip Abduction with Counter Support - 3 x weekly - 2 sets - 10-15 reps  Serratus Activation at Wall - 3 x weekly - 2 sets - 10 reps  Tandem Stance with Support - 3 x weekly - 2 sets - 30 seconds hold  Seated Neck Sidebending Stretch - 1 x daily - 3 sets - 30 seconds hold  Gentle Levator Scapulae Stretch - 1 x daily - 3 sets - 30 seconds hold     Timed Minutes      Total Timed Treatment:     38   mins  Total Time of Visit:             43   mins         ASSESSMENT/PLAN     GOALS  Goals                                                                           Progress Note due by 5/5/23                                                                                        Rehab due by 7/4/23            STG by: 6 weeks Comments Status   Pt will demonstrate proper PF 3+/5 or greater and TA activation  And relaxation in supine in order to progress with more functional strengthening.  MET   Pt will be independent with initial HEP, in order to accelerate progress.  modified  met    Pt will be able to empty bladder without hesitancy for 1 week.   MET           LTG by: 12 weeks       Pt will be independent with HEP including core, hip and PF strengthening.   progressing ongoing   Pt will demonstrate 4+/5 or greater hip abduction strength.    ongoing ongoing   Pt will be able to go 1 full day working at home without a increase in pelvic pain/pressure.  Some days does not notice ongoing   Pt will report 0-1 episodes of UI in a months' time.   none MET    Pt will report 0-1/3 on Marinoff scale.    MET     Pt will perform SLS (B) for 10 seconds or greater with proper stability.    MET      Assessment/Plan     ASSESSMENT: She met one more goal today. She overall has improved and is back to where she was prior to illness. She does still feel POP symptoms and tailbone pain. There are days where symptoms are not as bad. She does have ongoing hip/core and pelvic floor deficits, that can limit her ability to hold/carry/lift children.    PLAN:   Re-assess symptoms, progress with strengthening and updated HEP. Progress with mobility and strengthening as able. Consider biofeedback next visit.     SIGNATURE: Elisabeth Cohen PT T, [place] License #: [id number]  Electronically Signed on 4/6/2023    Clinical Progress: improved  Home Program Compliance: Yes  Progress toward previous goals: Partially Met      90 Day Recertification  Certification Period: 4/6/2023 through 7/4/2023  I certify that the therapy services are furnished while this patient is under my care.  The services outlined above are required by this patient, and will be reviewed every 90 days.     PHYSICIAN: Moncho Spencer MD (NPI: [id number])    Signature:___________________________________________DATE: _________    Please sign and return via fax to [phone].   Thank you so much for letting us work with Alaina. I appreciate your letting us work with your patients. If you have any questions or concerns, please don't hesitate to contact me.            [address] [address]  [phone]

## 2023-04-20 ENCOUNTER — TREATMENT (OUTPATIENT)
Dept: PHYSICAL THERAPY | Facility: CLINIC | Age: 38
End: 2023-04-20
Payer: COMMERCIAL

## 2023-04-20 DIAGNOSIS — M53.3 COCCYXDYNIA: Primary | ICD-10-CM

## 2023-04-20 DIAGNOSIS — R10.2 PELVIC PAIN: ICD-10-CM

## 2023-04-20 DIAGNOSIS — N94.10 DYSPAREUNIA, FEMALE: ICD-10-CM

## 2023-04-20 PROCEDURE — 97112 NEUROMUSCULAR REEDUCATION: CPT | Performed by: PHYSICAL THERAPIST

## 2023-04-20 PROCEDURE — 97140 MANUAL THERAPY 1/> REGIONS: CPT | Performed by: PHYSICAL THERAPIST

## 2023-04-20 NOTE — PROGRESS NOTES
Physical Therapy Treatment Note and 30 Day Progress Note  [address] [place] [address]    Patient: Alaina Brown                                                 Visit Date: 2023  :     1985    Referring practitioner:    Moncho Spencer,*  Date of Initial Visit:          Type: THERAPY  Noted: 10/3/2022     Patient seen for 14 sessions    Visit Diagnoses:    ICD-10-CM ICD-9-CM   1. Coccyxdynia  M53.3 724.79   2. Pelvic pain  R10.2 [id number]   3. Dyspareunia, female  N94.10 625.0     SUBJECTIVE     Subjective The pressure is doing better, not noticing pressure. The tailbone still hurting this week, more intense than prior, but not back to where it was prior to PT. It typically hurts more towards the end of the day when rocking baby to sleep.     PAIN: 0/10         OBJECTIVE     Objective      Manual Therapy     97864  Comments               Prone LS distraction     Prone external PF MFR     Prone STM with foam to thorcolumbar bruno and gluts      prone external PFM MFR with tennis ball and manually           Timed Minutes 23      Neuromuscular Reeducation     29640 Comments   Sitting on SB:  Pelvic rocks/tilts X 10 each  Marches x 10      1/2 kneeling diagonals on BOSU                 Timed Minutes 15     Therapy Education/Self Care 75561   Education offered today  SLS work on uneven surface, sitting posture/use of pillows with rocking   [facility] Code [email or web address]   Ongoing HEP   Access Code: [email or web address]  URL: [email or web address]  Date: 2023  Prepared by: Elisabeth Cohen    Exercises  Cat Cow to Child's Pose - 1 x daily - 5 reps  Quadruped Thoracic Rotation Full Range with Hand on Neck - 1 x daily - 2 sets - 10 reps  Seated Pelvic Floor Contraction - 1-2 x daily  Pillow Prop - 1-2 x daily  Hooklying Rib Cage Breathing - 2-3 x daily - 1-2 sets - 2-3 reps  Supine Transversus Abdominis Bracing - Hands on Stomach - 2-3 x  daily - 2-3 sets - 5-10 reps  Supine Hip Adductor Stretch - 1 x daily - 3 sets - 30 seconds hold  Supine Core Control March - 1 x daily - 7 x weekly - 2 sets - 10 reps  Seated of Supine thoracic mobilization towel roll vertical - 1-2 x daily - 3-5 minutes hold  Shoulder External Rotation and Scapular Retraction - 2 x daily - 10 reps  Supine Bridge with Mini Swiss Ball Between Knees - 3 x weekly - 2 sets - 10 reps  Standing Hip Abduction with Counter Support - 3 x weekly - 2 sets - 10-15 reps  Serratus Activation at Wall - 3 x weekly - 2 sets - 10 reps  Tandem Stance with Support - 3 x weekly - 2 sets - 30 seconds hold  Seated Neck Sidebending Stretch - 1 x daily - 3 sets - 30 seconds hold  Gentle Levator Scapulae Stretch - 1 x daily - 3 sets - 30 seconds hold     Timed Minutes      Total Timed Treatment:     38   mins  Total Time of Visit:             43   mins         ASSESSMENT/PLAN     GOALS  Goals                                                                           Progress Note due by 5/5/23                                                                                        Rehab due by 7/4/23            STG by: 6 weeks Comments Status   Pt will demonstrate proper PF 3+/5 or greater and TA activation  And relaxation in supine in order to progress with more functional strengthening.  MET   Pt will be independent with initial HEP, in order to accelerate progress.  modified  met    Pt will be able to empty bladder without hesitancy for 1 week.   MET           LTG by: 12 weeks       Pt will be independent with HEP including core, hip and PF strengthening.   progressing ongoing   Pt will demonstrate 4+/5 or greater hip abduction strength.    ongoing ongoing   Pt will be able to go 1 full day working at home without a increase in pelvic pain/pressure.  Some days does not notice ongoing   Pt will report 0-1 episodes of UI in a months' time.   none MET    Pt will report 0-1/3 on Marinoff scale.    MET    Pt  will perform SLS (B) for 10 seconds or greater with proper stability.    MET      Assessment & Plan     Assessment  Impairments: abnormal or restricted ROM, activity intolerance, impaired balance, impaired physical strength, lacks appropriate home exercise program and pain with function  Functional Limitations: carrying objects, lifting, sleeping, walking, pushing, uncomfortable because of pain, moving in bed, sitting and standing  Assessment details:     Prognosis: good    Plan  Therapy options: will be seen for skilled therapy services  Planned modality interventions: TENS, dry needling, low level laser therapy and electrical stimulation/Russian stimulation  Planned therapy interventions: manual therapy, motor coordination training, neuromuscular re-education, postural training, soft tissue mobilization, spinal/joint mobilization, strengthening, stretching, therapeutic activities, IADL retraining, home exercise program, gait training, functional ROM exercises, ADL retraining, abdominal trunk stabilization, body mechanics training, balance/weight-bearing training, flexibility, transfer training and joint mobilization  Frequency: 1x week  Duration in weeks: 12  Treatment plan discussed with: patient         ASSESSMENT: Pelvic pressure continues to improve, however her tailbone is still causing pain, especially with rocking son at night. We progressed with core and hip work on uneven surfaces. She did have difficulty with breathing work while on uneven surface.   PLAN:   Re-assess symptoms, progress with strengthening and updated HEP. Progress with mobility and strengthening as able. Progress on uneven surfaces. Consider biofeedback next visit.      SIGNATURE: Elisabeth Cohen, PT DPT, [place] License #: [id number]   Electronically Signed on 4/20/2023          [address] [place] [address]. [address]  [phone]

## 2023-05-09 ENCOUNTER — TREATMENT (OUTPATIENT)
Dept: PHYSICAL THERAPY | Facility: CLINIC | Age: 38
End: 2023-05-09
Payer: COMMERCIAL

## 2023-05-09 DIAGNOSIS — N94.10 DYSPAREUNIA, FEMALE: ICD-10-CM

## 2023-05-09 DIAGNOSIS — R10.2 PELVIC PAIN: ICD-10-CM

## 2023-05-09 DIAGNOSIS — M53.3 COCCYXDYNIA: Primary | ICD-10-CM

## 2023-05-09 PROCEDURE — 97110 THERAPEUTIC EXERCISES: CPT | Performed by: PHYSICAL THERAPIST

## 2023-05-09 PROCEDURE — 97112 NEUROMUSCULAR REEDUCATION: CPT | Performed by: PHYSICAL THERAPIST

## 2023-05-09 PROCEDURE — 97140 MANUAL THERAPY 1/> REGIONS: CPT | Performed by: PHYSICAL THERAPIST

## 2023-05-09 NOTE — PROGRESS NOTES
Physical Therapy Treatment Note and 30 Day Progress Note  [address] [place] [address]    Patient: Alaina Brown                                                 Visit Date: 2023  :     1985    Referring practitioner:    Moncho Spencer,*  Date of Initial Visit:          Type: THERAPY  Noted: 10/3/2022     Patient seen for 15 sessions    Visit Diagnoses:    ICD-10-CM ICD-9-CM   1. Coccyxdynia  M53.3 724.79   2. Pelvic pain  R10.2 [id number]   3. Dyspareunia, female  N94.10 625.0     SUBJECTIVE     Subjective Tailbone has been doing better, pressure has been worse for last 5 days. Her cow drug her about 100 feet and her whole body hurts now (rope was wrapped around her arm). It took her and her brother in law 40 minutes to catch the cow.     PAIN: 0/10       OBJECTIVE     Objective      Manual Therapy     95141  Comments   Prone LS distraction     Prone external PF MFR     Prone STM with foam to thorcolumbar bruno and gluts      prone external PFM MFR with tennis ball and manually           Timed Minutes 23      Therapeutic Exercises    72085 Units Comments   Prone pillow prop     HL pillow prop          Pillow props for HEP           Timed Minutes 15     Neuromuscular Reeducation     31993 Comments   Tall kneeling on BOSU Balance work   Tall kneeling on BOSU OH reach               Timed Minutes 15     Therapy Education/Self Care 25525   Education offered today  SLS work on uneven surface, sitting posture/use of pillows with rocking   [facility] Code [id number]   Ongoing HEP   Access Code: [email or web address]  URL: [email or web address]  Date: 2023  Prepared by: Elisabeth Cohen    Exercises  Cat Cow to Child's Pose - 1 x daily - 5 reps  Quadruped Thoracic Rotation Full Range with Hand on Neck - 1 x daily - 2 sets - 10 reps  Seated Pelvic Floor Contraction - 1-2 x daily  Pillow Prop - 1-2 x daily  Hooklying Rib Cage Breathing - 2-3  x daily - 1-2 sets - 2-3 reps  Supine Transversus Abdominis Bracing - Hands on Stomach - 2-3 x daily - 2-3 sets - 5-10 reps  Supine Hip Adductor Stretch - 1 x daily - 3 sets - 30 seconds hold  Supine Core Control March - 1 x daily - 7 x weekly - 2 sets - 10 reps  Seated of Supine thoracic mobilization towel roll vertical - 1-2 x daily - 3-5 minutes hold  Shoulder External Rotation and Scapular Retraction - 2 x daily - 10 reps  Supine Bridge with Mini Swiss Ball Between Knees - 3 x weekly - 2 sets - 10 reps  Standing Hip Abduction with Counter Support - 3 x weekly - 2 sets - 10-15 reps  Serratus Activation at Wall - 3 x weekly - 2 sets - 10 reps  Tandem Stance with Support - 3 x weekly - 2 sets - 30 seconds hold  Seated Neck Sidebending Stretch - 1 x daily - 3 sets - 30 seconds hold  Gentle Levator Scapulae Stretch - 1 x daily - 3 sets - 30 seconds hold     Timed Minutes      Total Timed Treatment:     38   mins  Total Time of Visit:             43   mins         ASSESSMENT/PLAN     GOALS  Goals                                                                           Progress Note due by 6/8/23                                                                                        Rehab due by 7/4/23            STG by: 6 weeks Comments Status   Pt will demonstrate proper PF 3+/5 or greater and TA activation  And relaxation in supine in order to progress with more functional strengthening.  MET   Pt will be independent with initial HEP, in order to accelerate progress.  modified  met    Pt will be able to empty bladder without hesitancy for 1 week.   MET           LTG by: 12 weeks       Pt will be independent with HEP including core, hip and PF strengthening.   progressing ongoing   Pt will demonstrate 4+/5 or greater hip abduction strength.    ongoing ongoing   Pt will be able to go 1 full day working at home without a increase in pelvic pain/pressure.  Increased after being pulled by cow ongoing   Pt will report 0-1  episodes of UI in a months' time.   none MET    Pt will report 0-1/3 on Marinoff scale.    MET    Pt will perform SLS (B) for 10 seconds or greater with proper stability.    MET      Assessment & Plan     Assessment  Impairments: abnormal or restricted ROM, activity intolerance, impaired balance, impaired physical strength, lacks appropriate home exercise program and pain with function  Functional Limitations: carrying objects, lifting, sleeping, walking, pushing, uncomfortable because of pain, moving in bed, sitting and standing  Assessment details:     Prognosis: good    Plan  Therapy options: will be seen for skilled therapy services  Planned modality interventions: TENS, dry needling, low level laser therapy and electrical stimulation/Russian stimulation  Planned therapy interventions: manual therapy, motor coordination training, neuromuscular re-education, postural training, soft tissue mobilization, spinal/joint mobilization, strengthening, stretching, therapeutic activities, IADL retraining, home exercise program, gait training, functional ROM exercises, ADL retraining, abdominal trunk stabilization, body mechanics training, balance/weight-bearing training, flexibility, transfer training and joint mobilization  Frequency: 1x week  Duration in weeks: 8  Treatment plan discussed with: patient         ASSESSMENT: She had increased pressure after trauma associated with cow dragging her and then trying to catch her. She had full body soreness, so we focused on relaxation and pillow propping. She also has difficulty with uneven surfaces and ongoing core instability. She felt relief post session.       PLAN:   Re-assess symptoms, progress with strengthening and updated HEP. Progress with mobility and strengthening as able. Progress on uneven surfaces. Consider biofeedback next visit    SIGNATURE: Elisabeth Cohen, PT DPT, [place] License #: [id number]   Electronically Signed on 5/9/2023          [place]  [place] [address]  [phone]

## 2023-05-22 ENCOUNTER — TREATMENT (OUTPATIENT)
Dept: PHYSICAL THERAPY | Facility: CLINIC | Age: 38
End: 2023-05-22
Payer: COMMERCIAL

## 2023-05-22 DIAGNOSIS — R10.2 PELVIC PAIN: ICD-10-CM

## 2023-05-22 DIAGNOSIS — N94.10 DYSPAREUNIA, FEMALE: ICD-10-CM

## 2023-05-22 DIAGNOSIS — M53.3 COCCYXDYNIA: Primary | ICD-10-CM

## 2023-05-22 PROCEDURE — 97112 NEUROMUSCULAR REEDUCATION: CPT | Performed by: PHYSICAL THERAPIST

## 2023-05-22 PROCEDURE — 97140 MANUAL THERAPY 1/> REGIONS: CPT | Performed by: PHYSICAL THERAPIST

## 2023-05-22 NOTE — PROGRESS NOTES
Physical Therapy Treatment Note and 30 Day Progress Note  [address] [place] [address]    Patient: Alaina Brown                                                 Visit Date: 2023  :     1985    Referring practitioner:    Moncho Spencer,*  Date of Initial Visit:          Type: THERAPY  Noted: 10/3/2022     Patient seen for 16 sessions    Visit Diagnoses:    ICD-10-CM ICD-9-CM   1. Coccyxdynia  M53.3 724.79   2. Dyspareunia, female  N94.10 625.0   3. Pelvic pain  R10.2 [id number]     SUBJECTIVE     Subjective Has pressure in the evenings, but overall better. She has been doing her HEP regularly.       PAIN: 0/10       OBJECTIVE     Objective      sEMG Biofeedback (55929) Activity Position Resting Tone (mV) Sets/reps Time (sec) Comments                 PFM contraction/relaxation sitting < 2 2  5 10  0 10-20 mv max; with marches    PFM contraction/relaxation  standing  2-3     10 mV; able to hold 10 seconds             Timed Minutes: 23      Manual Therapy     19980  Comments   Prone LS distraction     Prone external PF MFR     Prone STM with foam to thorcolumbar bruno and gluts      prone external PFM MFR with tennis ball and manually           Timed Minutes 15        Therapy Education/Self Care 56291   Education offered today  SLS work on uneven surface, sitting posture/use of pillows with rocking   [employer] Code [id number]   Ongoing HEP   Access Code: [email or web address]  URL: [email or web address]  Date: 2023  Prepared by: Elisabeth Cohen    Exercises  Cat Cow to Child's Pose - 1 x daily - 5 reps  Quadruped Thoracic Rotation Full Range with Hand on Neck - 1 x daily - 2 sets - 10 reps  Seated Pelvic Floor Contraction - 1-2 x daily  Pillow Prop - 1-2 x daily  Hooklying Rib Cage Breathing - 2-3 x daily - 1-2 sets - 2-3 reps  Supine Transversus Abdominis Bracing - Hands on Stomach - 2-3 x daily - 2-3 sets - 5-10 reps  Supine Hip  Adductor Stretch - 1 x daily - 3 sets - 30 seconds hold  Supine Core Control March - 1 x daily - 7 x weekly - 2 sets - 10 reps  Seated of Supine thoracic mobilization towel roll vertical - 1-2 x daily - 3-5 minutes hold  Shoulder External Rotation and Scapular Retraction - 2 x daily - 10 reps  Supine Bridge with Mini Swiss Ball Between Knees - 3 x weekly - 2 sets - 10 reps  Standing Hip Abduction with Counter Support - 3 x weekly - 2 sets - 10-15 reps  Serratus Activation at Wall - 3 x weekly - 2 sets - 10 reps  Tandem Stance with Support - 3 x weekly - 2 sets - 30 seconds hold  Seated Neck Sidebending Stretch - 1 x daily - 3 sets - 30 seconds hold  Gentle Levator Scapulae Stretch - 1 x daily - 3 sets - 30 seconds hold     Timed Minutes      Total Timed Treatment:     38   mins  Total Time of Visit:             48 mins         ASSESSMENT/PLAN     GOALS  Goals                                                                           Progress Note due by 6/21/23                                                                                        Rehab due by 7/4/23            STG by: 6 weeks Comments Status   Pt will demonstrate proper PF 3+/5 or greater and TA activation  And relaxation in supine in order to progress with more functional strengthening.  MET   Pt will be independent with initial HEP, in order to accelerate progress.  modified  met    Pt will be able to empty bladder without hesitancy for 1 week.   MET           LTG by: 12 weeks       Pt will be independent with HEP including core, hip and PF strengthening.   progressing ongoing   Pt will demonstrate 4+/5 or greater hip abduction strength.    ongoing ongoing   Pt will be able to go 1 full day working at home without a increase in pelvic pain/pressure.  Increased pressure at the end of the day, but better overall ongoing   Pt will report 0-1 episodes of UI in a months' time.   none MET    Pt will report 0-1/3 on Marinoff scale.    MET    Pt will  perform SLS (B) for 10 seconds or greater with proper stability.    MET      Assessment & Plan     Assessment  Impairments: abnormal or restricted ROM, activity intolerance, impaired balance, impaired physical strength, lacks appropriate home exercise program and pain with function  Functional Limitations: carrying objects, lifting, sleeping, walking, pushing, uncomfortable because of pain, moving in bed, sitting and standing  Assessment details:     Prognosis: good    Plan  Therapy options: will be seen for skilled therapy services  Planned modality interventions: TENS, dry needling, low level laser therapy and electrical stimulation/Russian stimulation  Planned therapy interventions: manual therapy, motor coordination training, neuromuscular re-education, postural training, soft tissue mobilization, spinal/joint mobilization, strengthening, stretching, therapeutic activities, IADL retraining, home exercise program, gait training, functional ROM exercises, ADL retraining, abdominal trunk stabilization, body mechanics training, balance/weight-bearing training, flexibility, transfer training and joint mobilization  Frequency: 1x week  Duration in weeks: 8  Treatment plan discussed with: patient       ASSESSMENT: She is having ongoing pressure by the end of the day, but overall symptoms are improving. She demonstrated improved coordination with PFM activation seated and standing today.    PLAN:  Continue to work on progressive HEP, strengthening/uneven surface work and PFM coordination.     SIGNATURE: Elisabeth Cohen, PT DPT, [place] License #: [id number]   Electronically Signed on 5/22/2023          [place] Chopra. [address]  [phone]

## 2023-08-03 ENCOUNTER — TREATMENT (OUTPATIENT)
Dept: PHYSICAL THERAPY | Facility: CLINIC | Age: 38
End: 2023-08-03
Payer: COMMERCIAL

## 2023-08-03 DIAGNOSIS — N94.10 DYSPAREUNIA, FEMALE: ICD-10-CM

## 2023-08-03 DIAGNOSIS — M53.3 COCCYXDYNIA: Primary | ICD-10-CM

## 2023-08-03 DIAGNOSIS — R10.2 PELVIC PAIN: ICD-10-CM

## 2023-08-03 PROCEDURE — 97140 MANUAL THERAPY 1/> REGIONS: CPT | Performed by: PHYSICAL THERAPIST

## 2023-08-03 PROCEDURE — 97535 SELF CARE MNGMENT TRAINING: CPT | Performed by: PHYSICAL THERAPIST

## 2023-08-31 ENCOUNTER — TREATMENT (OUTPATIENT)
Dept: PHYSICAL THERAPY | Facility: CLINIC | Age: 38
End: 2023-08-31
Payer: COMMERCIAL

## 2023-08-31 DIAGNOSIS — N94.10 DYSPAREUNIA, FEMALE: ICD-10-CM

## 2023-08-31 DIAGNOSIS — R10.2 PELVIC PAIN: ICD-10-CM

## 2023-08-31 DIAGNOSIS — M53.3 COCCYXDYNIA: Primary | ICD-10-CM

## 2023-08-31 NOTE — PROGRESS NOTES
Physical Therapy Treatment Note and 30 Day Progress Note  [address] [place] [address]    Patient: Alaina Brown                                                 Visit Date: 2023  :     1985    Referring practitioner:    Moncho Spencer,*  Date of Initial Visit:          Type: THERAPY  Noted: 10/3/2022     Patient seen for 20 sessions    Visit Diagnoses:    ICD-10-CM ICD-9-CM   1. Coccyxdynia  M53.3 724.79   2. Dyspareunia, female  N94.10 625.0   3. Pelvic pain  R10.2 [id number]     SUBJECTIVE     Subjective The pressure has been doing better. It's been two weeks since her cycle and thinks that is why. She has started PT for her neck and back on the . They will see her 2x/week. Today is a good day but yesterday was very bad. She had a lot of headaches. She also sees a chiro occasionally. The spine doctor saw a slight herniation. She is going to get a neck MRI if PT doesn't help.     PAIN: 4-5/10 < 2-3/10       OBJECTIVE     Objective      Manual Therapy     23027  Comments   STM w/ massage cream to B lumbar Tender near spine    IASTM w/ double lacrosse ball to thoracic and gluteals      B prone sacral MICHELLE     Prone W stretch  Modified to 1 at a time          Timed Minutes 40      Therapeutic Exercises    79666 Units Comments   Assessed B hip abd MMT                          Timed Minutes 5      Therapy Education/Self Care [id number]   Education offered today Symptoms/stress/pressure- lower back issues/anatomy   [facility] Code [id number]   Ongoing HEP   Access Code: [email or web address]  URL: [email or web address]  Date: 2023  Prepared by: Elisabeth Cohen    Exercises  Cat Cow to Child's Pose - 1 x daily - 5 reps  Quadruped Thoracic Rotation Full Range with Hand on Neck - 1 x daily - 2 sets - 10 reps  Seated Pelvic Floor Contraction - 1-2 x daily  Pillow Prop - 1-2 x daily  Hooklying Rib Cage Breathing - 2-3 x daily - 1-2 sets -  2-3 reps  Supine Transversus Abdominis Bracing - Hands on Stomach - 2-3 x daily - 2-3 sets - 5-10 reps  Supine Hip Adductor Stretch - 1 x daily - 3 sets - 30 seconds hold  Supine Core Control March - 1 x daily - 7 x weekly - 2 sets - 10 reps  Seated of Supine thoracic mobilization towel roll vertical - 1-2 x daily - 3-5 minutes hold  Shoulder External Rotation and Scapular Retraction - 2 x daily - 10 reps  Supine Bridge with Mini Swiss Ball Between Knees - 3 x weekly - 2 sets - 10 reps  Standing Hip Abduction with Counter Support - 3 x weekly - 2 sets - 10-15 reps  Serratus Activation at Wall - 3 x weekly - 2 sets - 10 reps  Tandem Stance with Support - 3 x weekly - 2 sets - 30 seconds hold  Seated Neck Sidebending Stretch - 1 x daily - 3 sets - 30 seconds hold  Gentle Levator Scapulae Stretch - 1 x daily - 3 sets - 30 seconds hold     Timed Minutes        Total Timed Treatment:     45   mins  Total Time of Visit:             45 mins         ASSESSMENT/PLAN     GOALS  Goals                                                                           Progress Note due by 9/30/23                                                                                        Rehab due by 9/24/23            STG by: 6 weeks Comments Status   Pt will demonstrate proper PF 3+/5 or greater and TA activation  And relaxation in supine in order to progress with more functional strengthening.  MET   Pt will be independent with initial HEP, in order to accelerate progress.  modified  met   Pt will be able to empty bladder without hesitancy for 1 week.  This last week she has started to experience this.  Previously met    LTG by: 12 weeks       Pt will be independent with HEP including core, hip and PF strengthening.   performs daily  Progressing    Pt will demonstrate 4+/5 or greater hip abduction strength.    L 5/5 R 4+/5 Partially Met    Pt will be able to go 1 full day working at home without a increase in pelvic pain/pressure.  Worsens  with her LBP/neck pain symptoms  ongoing   Pt will report 0-1 episodes of UI in a months' time.   none MET    Pt will report 0-1/3 on Marinoff scale.   has gotten worse but her back has started hurting again; has pain over the pubis, it's very tender to touch. Even her wiping feels sensitive.  Previously met; ongoing     Pt will perform SLS (B) for 10 seconds or greater with proper stability.    MET      Assessment/Plan     ASSESSMENT: At this time, Alaina has met 2/3 STGs and 2/6 LTGs. Some of her previously met goals have regressed as her LBP has worsened. She has just began PT for her neck and back so hopefully that helps. It is likely her increased chronic pain has in turn, increased tension in her PFM which could be why she is beginning to struggle with bladder emptying and pain over her pubis. Pt would continue to benefit from skilled PT to further improve hip/core strength with breathing coordination to reduce IA pressure while working on PFM coordination.     PLAN: Assess response to coveroll tape test strip and consider taping to offload coccyx. Continue to work on progressive HEP, strengthening/uneven surface work and PFM coordination.     SIGNATURE: Tete Galeas PTA, [place] License #: [id number]  Electronically Signed on 8/31/2023          [address] [place] [address]  [phone]

## 2023-09-01 NOTE — PROGRESS NOTES
Progress Note Addendum      Patient: Alaina Brown           : 1985  Visit Date: 2023  Referring practitioner: Moncho Spencer,*  Date of Initial Visit: Type: THERAPY  Noted: 10/3/2022  Patient seen for 20 sessions  Visit Diagnoses:    ICD-10-CM ICD-9-CM   1. Coccyxdynia  M53.3 724.79   2. Dyspareunia, female  N94.10 625.0   3. Pelvic pain  R10.2 [id number]          Clinical Progress: improved  Home Program Compliance: Yes  Progress toward previous goals: Partially Met  Prognosis to achieve goals: good    Objective   See PTA note for goals  Assessment & Plan       Assessment  Impairments: abnormal or restricted ROM, activity intolerance, impaired balance, impaired physical strength, lacks appropriate home exercise program and pain with function   Functional limitations: carrying objects, lifting, sleeping, walking, pushing, uncomfortable because of pain, moving in bed, sitting and standing   Assessment details:     Prognosis: good    Plan  Therapy options: will be seen for skilled therapy services  Planned modality interventions: TENS, dry needling, low level laser therapy and electrical stimulation/Russian stimulation  Planned therapy interventions: manual therapy, motor coordination training, neuromuscular re-education, postural training, soft tissue mobilization, spinal/joint mobilization, strengthening, stretching, therapeutic activities, IADL retraining, home exercise program, gait training, functional ROM exercises, ADL retraining, abdominal trunk stabilization, body mechanics training, balance/weight-bearing training, flexibility, transfer training and joint mobilization  Frequency: 1x week  Duration in weeks: 8  Treatment plan discussed with: patient      I reviewed the treatment and goals with Tete Galeas PTA and agree with the POC.    SIGNATURE: Fifi Maciel PT, License #: [id number]  Electronically Signed on 2023

## 2023-11-07 ENCOUNTER — TREATMENT (OUTPATIENT)
Dept: PHYSICAL THERAPY | Facility: CLINIC | Age: 38
End: 2023-11-07
Payer: COMMERCIAL

## 2023-11-07 DIAGNOSIS — R10.2 PELVIC PAIN: ICD-10-CM

## 2023-11-07 DIAGNOSIS — M53.3 COCCYXDYNIA: Primary | ICD-10-CM

## 2023-11-07 DIAGNOSIS — N94.10 DYSPAREUNIA, FEMALE: ICD-10-CM

## 2023-11-07 PROCEDURE — 97110 THERAPEUTIC EXERCISES: CPT

## 2023-11-07 PROCEDURE — 97535 SELF CARE MNGMENT TRAINING: CPT

## 2023-11-07 NOTE — PROGRESS NOTES
Progress Note Addendum      Patient: Alaina Brown           : 1985  Visit Date: 2023  Referring practitioner: Moncho Spencer,*  Date of Initial Visit: Type: THERAPY  Noted: 10/3/2022  Patient seen for 21 sessions  Visit Diagnoses:    ICD-10-CM ICD-9-CM   1. Coccyxdynia  M53.3 724.79   2. Dyspareunia, female  N94.10 625.0   3. Pelvic pain  R10.2 [id number]          Clinical Progress: improved  Home Program Compliance: Yes  Progress toward previous goals: Partially Met  Prognosis to achieve goals: good    Objective   See PTA note for goals.     Assessment & Plan       Assessment  Impairments: abnormal or restricted ROM, activity intolerance, impaired balance, impaired physical strength, lacks appropriate home exercise program and pain with function   Functional limitations: carrying objects, lifting, sleeping, walking, pushing, uncomfortable because of pain, moving in bed, sitting and standing   Assessment details:     Prognosis: good    Plan  Therapy options: will be seen for skilled therapy services  Planned modality interventions: TENS, dry needling, low level laser therapy and electrical stimulation/Russian stimulation  Planned therapy interventions: manual therapy, motor coordination training, neuromuscular re-education, postural training, soft tissue mobilization, spinal/joint mobilization, strengthening, stretching, therapeutic activities, IADL retraining, home exercise program, gait training, functional ROM exercises, ADL retraining, abdominal trunk stabilization, body mechanics training, balance/weight-bearing training, flexibility, transfer training and joint mobilization  Frequency: 1x week  Duration in weeks: 8  Treatment plan discussed with: patient        I reviewed the treatment and goals with Tete Galeas PTA and agree with the POC.    SIGNATURE: Janis Membreno PT, License #: [id number]    Electronically Signed on 2023

## 2023-11-07 NOTE — PROGRESS NOTES
Physical Therapy Treatment Note, 30 Day Progress Note, and 90 Day Recertification Note  [address] [place] [address]    Patient: Alaina Brown                                                 Visit Date: 2023  :     1985    Referring practitioner:    Moncho Spencer,*  Date of Initial Visit:          Type: THERAPY  Noted: 10/3/2022     Patient seen for 21 sessions    Visit Diagnoses:    ICD-10-CM ICD-9-CM   1. Coccyxdynia  M53.3 724.79   2. Dyspareunia, female  N94.10 625.0   3. Pelvic pain  R10.2 [id number]     SUBJECTIVE     Subjective Her whole family was really sick and had to cancel her appt. She has been managing her symptoms pretty well. She still has intense pressure around her cycle. 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 She will go once a week, she's a little sore but otherwise it's going well. Specific movements produce very sharp pains in her pubic symphysis, specifically with SLR (R>L). She just got an SI belt. She and her  experienced a number of miscarriages when they first got  so she went to the [facility] and had her hips adjusted and for years, she was unable to place her knees together. And still prefers a pillow between her legs if she lies on her side.      PAIN: 3/10 LBP and hips < 2-3/10     OBJECTIVE     Objective      Therapeutic Exercises    66719 Units Comments   Demo'd SLR w/ core activation      Seated velvet stretch      B standing hip abd + exhale      Standing thoracic open book at wall + exhale      Standing cat/cow against table + exhale      Runner's march + exhale                Timed Minutes 24      Therapy Education/Self Care 08626   Education offered today Educated patient on anatomy and function of diaphragm and how that relates to IA pressure. Further explained the impact IA pressure has on PFM and how higher  consistent higher levels of pressure can lead to PFD. Instructed pt on proper breathing coordination to reduce IA pressure.    [employer] Code [id number]   Ongoing HEP   Access Code: [email or web address]  URL: [email or web address]  Date: 11/07/2023  Prepared by: Tete Galeas    Exercises  - Seated Pelvic Floor Contraction  - 1-2 x daily  - Pillow Prop  - 1-2 x daily  - Seated of Supine thoracic mobilization towel roll vertical   - 1-2 x daily - 3-5 minutes hold  - Standing Hip Abduction with Counter Support  - 2 x weekly - 2 sets - 10 reps  - Standing Thoracic Open Book at Wall  - 3-5 x weekly - 2 sets - 10 reps  - Modified Cat Cow at Chair  - 3-5 x weekly - 2 sets - 10 reps  - Seated Figure 4 Piriformis Stretch  - 3-5 x weekly - 2 sets - 10 reps  - Runner's March  - 3-5 x weekly - 2 sets - 10 reps  - Single Leg Stance  - 1 x weekly - 2 sets - 30 seconds hold     Timed Minutes 16       Total Timed Treatment:     40   mins  Total Time of Visit:             40 mins         ASSESSMENT/PLAN     GOALS  Goals                                                                           Progress Note due by 12/7/23                                                                                        Rehab due by 2/5/24            STG by: 6 weeks Comments Status   Pt will demonstrate proper PF 3+/5 or greater and TA activation  And relaxation in supine in order to progress with more functional strengthening.  MET   Pt will be independent with initial HEP, in order to accelerate progress.  modified  met   Pt will be able to empty bladder without hesitancy for 1 week.  Most of the day, this is improved, however by 7-8 at night, she has to sit for a little while.  Previously met    LTG by: 12 weeks       Pt will be independent with HEP including core, hip and PF strengthening.   the past 2-3 weeks have been very busy and that's lead to a struggle. Before then, she has been consistent. Her other PT gave her bridges 3x10.  Progressing     Pt will demonstrate 4+/5 or greater hip abduction strength.    L 5/5 R 4+/5 Partially Met    Pt will be able to go 1 full day working at home without a increase in pelvic pain/pressure.  Some days she can go without the pelvic pressure. Does have daily hip/leg pain.   ongoing   Pt will report 0-1 episodes of UI in a months' time.   none MET    Pt will report 0-1/3 on Marinoff scale.   still feels irritating but has improved.   Previously met; ongoing     Pt will perform SLS (B) for 10 seconds or greater with proper stability.    MET      Assessment/Plan     ASSESSMENT: Today all goals were assessed for progress note/re-certification. At this time, Alaina has met 2/3 STGs and 2/6 LTGs. She has only just began a POC for her SI and LBP symptoms which I believe is largely contributing to her pain symptoms. She was also unaware of the benefits of breathing coordination to reduce IA pressure which we discussed today. She will be increasingly busy for the holiday season, therefore her HEP was reduced and modified. Her bladder emptying seems to be improving however she still struggles towards the end of the day. Pt would continue to benefit from skilled PT to further improve hip/core strength with breathing coordination to reduce IA pressure while working on PFM coordination.     PLAN: Assess response to coveroll tape test strip and consider taping to offload coccyx. Continue to work on progressive HEP, strengthening/uneven surface work and PFM coordination.     SIGNATURE: Tete Galeas PTA, [place] License #: [id number]  Electronically Signed on 11/7/2023          [address] [place] [address], [place] [address]  [phone]

## 2023-12-07 ENCOUNTER — TREATMENT (OUTPATIENT)
Dept: PHYSICAL THERAPY | Facility: CLINIC | Age: 38
End: 2023-12-07
Payer: COMMERCIAL

## 2023-12-07 DIAGNOSIS — R10.2 PELVIC PAIN: ICD-10-CM

## 2023-12-07 DIAGNOSIS — N94.10 DYSPAREUNIA, FEMALE: ICD-10-CM

## 2023-12-07 DIAGNOSIS — M53.3 COCCYXDYNIA: Primary | ICD-10-CM

## 2023-12-07 NOTE — PROGRESS NOTES
Physical Therapy Treatment Note and 30 Day Progress Note  [address] [place] [address]    Patient: Alaina Brown                                                 Visit Date: 2023  :     1985    Referring practitioner:    Moncho Spencer,*  Date of Initial Visit:          Type: THERAPY  Noted: 10/3/2022     Patient seen for 22 sessions    Visit Diagnoses:    ICD-10-CM ICD-9-CM   1. Coccyxdynia  M53.3 724.79   2. Dyspareunia, female  N94.10 625.0   3. Pelvic pain  R10.2 [id number]     SUBJECTIVE     Subjective Her family got sick again which is why she missed last session, her included. Her symptoms are okay. They were better, but got a little worse when she was sick. But they are getting better again. Her tailbone has been bothering her again. She still has a great deal of pressure. She got pretty itchy, no rash, but irritated. She wears her SI belt as much as she can during the day time if she's up. She is still attending PT for her back, her pain isn't better yet but she has more range of motion. She attends 1x/week. They have primarily been exercising with her. She has been given more HEP from them as well. Strengthens PFM daily but changes positions based on her pain/pressure.     PAIN: 4/10  coccyx and saddle< 2-3/10     OBJECTIVE     Objective      sEMG Biofeedback (66424) Activity Position Resting Tone (mV) Sets/reps Time (sec) Comments   Attempted to utilize and it was malfunctioning                              Timed Minutes: 7       Therapy Education/Self Care 39910   Education offered today Discussed POC and frequency and HEP responsibility. Encouraged functional kegels vs isolated kegels.    [facility] Code [email or web address]   Ongoing HEP   Access Code: [email or web address]  URL: [email or web address]  Date: 2023  Prepared by: Tete Galeas    Exercises  - Seated Pelvic Floor Contraction  - 1-2 x daily  - Pillow Prop  - 1-2 x  daily  - Seated of Supine thoracic mobilization towel roll vertical   - 1-2 x daily - 3-5 minutes hold  - Standing Hip Abduction with Counter Support  - 2 x weekly - 2 sets - 10 reps  - Standing Thoracic Open Book at Wall  - 3-5 x weekly - 2 sets - 10 reps  - Modified Cat Cow at Chair  - 3-5 x weekly - 2 sets - 10 reps  - Seated Figure 4 Piriformis Stretch  - 3-5 x weekly - 2 sets - 10 reps  - Runner's March  - 3-5 x weekly - 2 sets - 10 reps  - Single Leg Stance  - 1 x weekly - 2 sets - 30 seconds hold     Timed Minutes 38       Total Timed Treatment:     45   mins  Total Time of Visit:             45 mins         ASSESSMENT/PLAN     GOALS  Goals                                                       Progress Note due by 12/7/23                                                                            Re-cert due by 2/5/24            STG by: 6 weeks Comments Status   Pt will demonstrate proper PF 3+/5 or greater and TA activation  And relaxation in supine in order to progress with more functional strengthening.  MET   Pt will be independent with initial HEP, in order to accelerate progress.  modified  met   Pt will be able to empty bladder without hesitancy for 1 week.  This has really improved. Only notices it at the end of the day before her kids bedtime  Progressing   LTG by: 12 weeks       Pt will be independent with HEP including core, hip and PF strengthening.   performs HEP 4x/week  Progressing    Pt will demonstrate 4+/5 or greater hip abduction strength.    L 5/5 R 4+/5 Partially Met    Pt will be able to go 1 full day working at home without a increase in pelvic pain/pressure.   Worse in the afternoon. Slightly noticeable in the mornings.  ongoing   Pt will report 0-1 episodes of UI in a months' time.   none MET    Pt will report 0-1/3 on Marinoff scale.   slightly painful but improving. Will hurt upon entry and tender throughout. Sometimes pain will end it completely.  Previously met; ongoing     Pt will  perform SLS (B) for 10 seconds or greater with proper stability.    MET      Assessment/Plan     ASSESSMENT: Today all goals were assessed for progress note. At this time, Alaina has met 2/3 STGs and 2/6 LTGs. She has been unable to attend therapy since last assessment of goals due to illness therefore much has gone unchanged. She did report improved HEP compliance as well as engaging in PT for her LBP/SI which is also going well. She did report PFM activation in sitting is difficult at times, therefore she will lie down to perform, which is fine. However it was recommended she strengthen functionally in coordination with exertion and breathing. Pt would continue to benefit from skilled PT to further improve hip/core strength with breathing coordination to reduce IA pressure while working on PFM coordination.     PLAN: Continue to work on progressive HEP, strengthening/uneven surface work and PFM coordination.     SIGNATURE: Tete Galeas PTA, [place] License #: [id number]  Electronically Signed on 12/7/2023          [place] Chopra. [address]  [phone]

## 2023-12-08 NOTE — PROGRESS NOTES
Progress Note Addendum      Patient: Alaina Brown           : 1985  Visit Date: 2023  Referring practitioner: Moncho Spencer,*  Date of Initial Visit: Type: THERAPY  Noted: 10/3/2022  Patient seen for 22 sessions  Visit Diagnoses:    ICD-10-CM ICD-9-CM   1. Coccyxdynia  M53.3 724.79   2. Dyspareunia, female  N94.10 625.0   3. Pelvic pain  R10.2 [id number]          Clinical Progress: unchanged since last PN due to illness, improved since IE  Home Program Compliance: Yes  Progress toward previous goals: Partially Met  Prognosis to achieve goals: good    Objective   See PTA note for goals.     Assessment & Plan       Assessment  Impairments: abnormal or restricted ROM, activity intolerance, impaired balance, impaired physical strength, lacks appropriate home exercise program and pain with function   Functional limitations: carrying objects, lifting, sleeping, walking, pushing, uncomfortable because of pain, moving in bed, sitting and standing   Assessment details:     Prognosis: good    Plan  Therapy options: will be seen for skilled therapy services  Planned modality interventions: TENS, dry needling, low level laser therapy and electrical stimulation/Russian stimulation  Planned therapy interventions: manual therapy, motor coordination training, neuromuscular re-education, postural training, soft tissue mobilization, spinal/joint mobilization, strengthening, stretching, therapeutic activities, IADL retraining, home exercise program, gait training, functional ROM exercises, ADL retraining, abdominal trunk stabilization, body mechanics training, balance/weight-bearing training, flexibility, transfer training and joint mobilization  Frequency: 1x week  Duration in weeks: 8  Treatment plan discussed with: patient        I reviewed the treatment and goals with Tete Galeas PTA and agree with the POC.    SIGNATURE: Janis Membreno PT, License #: [id number]    Electronically Signed on  12/8/2023

## 2024-01-18 ENCOUNTER — TREATMENT (OUTPATIENT)
Dept: PHYSICAL THERAPY | Facility: CLINIC | Age: 39
End: 2024-01-18
Payer: COMMERCIAL

## 2024-01-18 DIAGNOSIS — R10.2 PELVIC PAIN: ICD-10-CM

## 2024-01-18 DIAGNOSIS — M53.3 COCCYXDYNIA: Primary | ICD-10-CM

## 2024-01-18 DIAGNOSIS — N94.10 DYSPAREUNIA, FEMALE: ICD-10-CM

## 2024-01-18 NOTE — PROGRESS NOTES
Physical Therapy Treatment Note and 30 Day Progress Note  [address] [place] [address]    Patient: Alaina Brown                                                 Visit Date: 2024  :     1985    Referring practitioner:    Moncho Spencer,*  Date of Initial Visit:          Type: THERAPY  Noted: 10/3/2022     Patient seen for 23 sessions    Visit Diagnoses:    ICD-10-CM ICD-9-CM   1. Coccyxdynia  M53.3 724.79   2. Dyspareunia, female  N94.10 625.0   3. Pelvic pain  R10.2 [id number]     SUBJECTIVE     Subjective She got sick again and coughed a lot for about 3 weeks. Surprisingly, she only had UI a couple days with her cough and is back to normal. The MRI of her neck had 4 mild impingements and 1 moderate impingements. Her tailbone was doing really good until the last week of her sickness.     PAIN: 4/10  LBP and head ache       OBJECTIVE     Objective        Therapeutic Exercises    15314 Units Comments   B standing hip abd against red TB + exhale   Reviewed for HEP    Standing openbooks at wall with red TB + exhale   Reviewed for HEP    Seated hamstring stretch  Reviewed for HEP              Timed Minutes 18      Therapy Education/Self Care 77829   Education offered today Discussed POC and frequency and HEP responsibility. Encouraged functional kegels vs isolated kegels.    [employer] Code [email or web address]   Ongoing HEP   Access Code: [email or web address]  URL: [email or web address]  Date: 2024  Prepared by: Tete Galeas    Exercises  - Seated Pelvic Floor Contraction  - 1-2 x daily  - Pillow Prop  - 1-2 x daily  - Seated of Supine thoracic mobilization towel roll vertical   - 1-2 x daily - 3-5 minutes hold  - Standing Hip Abduction with Resistance at Ankles and Counter Support  - 3-5 x weekly - 2 sets - 10 reps  - Standing Thoracic Open Book at Wall  - 3-5 x weekly - 2 sets - 10 reps  - Modified Cat Cow at Chair  - 3-5 x weekly - 2 sets  - 10 reps  - Seated Figure 4 Piriformis Stretch  - 3-5 x weekly - 2 sets - 10 reps  - Runner's March  - 3-5 x weekly - 2 sets - 10 reps  - Single Leg Stance  - 1 x weekly - 2 sets - 30 seconds hold  - Seated Table Hamstring Stretch  - 1-2 x daily - 7 x weekly - 3 reps - 30 sec hold     Timed Minutes 10       Total Timed Treatment:     28   mins  Total Time of Visit:             28 mins         ASSESSMENT/PLAN     GOALS  Goals                                                       Progress Note due by 2/17/23                                                                            Re-cert due by 2/5/24            STG by: 6 weeks Comments Status   Pt will demonstrate proper PF 3+/5 or greater and TA activation  And relaxation in supine in order to progress with more functional strengthening.  MET   Pt will be independent with initial HEP, in order to accelerate progress.  modified  met   Pt will be able to empty bladder without hesitancy for 1 week.  The last week has been a little worse.  Progressing   LTG by: 12 weeks       Pt will be independent with HEP including core, hip and PF strengthening.   tries to perform HEP daily, but if not, every other day.  Progressing    Pt will demonstrate 4+/5 or greater hip abduction strength.    L 5/5 R 4+/5 Partially Met    Pt will be able to go 1 full day working at home without a increase in pelvic pain/pressure.   Last week she has noticed it upon waking but things are improving.  ongoing   Pt will report 0-1 episodes of UI in a months' time.   none MET    Pt will report 0-1/3 on Marinoff scale.   She still has family staying at her house. And they have all been sick so they haven't had a lot of opportunity recently  Previously met; ongoing     Pt will perform SLS (B) for 10 seconds or greater with proper stability.    MET      Assessment/Plan     ASSESSMENT: Today all goals were assessed for progress note. At this time, Alaina has met 2/3 STGs and 2/6 LTGs. Since last  visit, she has been sick with a really heavy cough. Fortunately she only experienced increased symptoms a few days out of the 3 weeks she was sick and is starting to show improvement since becoming well. We progressed her HEP. She could feel the hamstring stretch in her calf as well. Pt would continue to benefit from skilled PT to further improve hip/core strength with breathing coordination to reduce IA pressure while working on PFM coordination.     PLAN: Continue to work on progressive HEP, strengthening/uneven surface work and PFM coordination.     SIGNATURE: Tete Galeas PTA, [place] License #: [id number]  Electronically Signed on 1/18/2024          [address] [place] [address] [place] [address]  [phone]

## 2024-01-18 NOTE — PROGRESS NOTES
Progress Note Addendum      Patient: Alaina Brown           : 1985  Visit Date: 2024  Referring practitioner: Moncho Spencer,*  Date of Initial Visit: Type: THERAPY  Noted: 10/3/2022  Patient seen for 23 sessions  Visit Diagnoses:    ICD-10-CM ICD-9-CM   1. Coccyxdynia  M53.3 724.79   2. Dyspareunia, female  N94.10 625.0   3. Pelvic pain  R10.2 [id number]          Clinical Progress: improved  Home Program Compliance: Yes  Progress toward previous goals: Partially Met  Prognosis to achieve goals: good    Objective   See PTA note for goals.     Assessment & Plan       Assessment  Impairments: abnormal or restricted ROM, activity intolerance, impaired balance, impaired physical strength, lacks appropriate home exercise program and pain with function   Functional limitations: carrying objects, lifting, sleeping, walking, pushing, uncomfortable because of pain, moving in bed, sitting and standing   Assessment details:     Prognosis: good    Plan  Therapy options: will be seen for skilled therapy services  Planned modality interventions: TENS, dry needling, low level laser therapy and electrical stimulation/Russian stimulation  Planned therapy interventions: manual therapy, motor coordination training, neuromuscular re-education, postural training, soft tissue mobilization, spinal/joint mobilization, strengthening, stretching, therapeutic activities, IADL retraining, home exercise program, gait training, functional ROM exercises, ADL retraining, abdominal trunk stabilization, body mechanics training, balance/weight-bearing training, flexibility, transfer training and joint mobilization  Frequency: 1x week  Duration in weeks: 8  Treatment plan discussed with: patient        I reviewed the treatment and goals with Tete Galeas PTA and agree with the POC.    SIGNATURE: Janis Membreno PT, License #: [id number]    Electronically Signed on 2024

## 2024-02-15 ENCOUNTER — TREATMENT (OUTPATIENT)
Dept: PHYSICAL THERAPY | Facility: CLINIC | Age: 39
End: 2024-02-15
Payer: COMMERCIAL

## 2024-02-15 DIAGNOSIS — M53.3 COCCYXDYNIA: Primary | ICD-10-CM

## 2024-02-15 DIAGNOSIS — N94.10 DYSPAREUNIA, FEMALE: ICD-10-CM

## 2024-02-15 DIAGNOSIS — R10.2 PELVIC PAIN: ICD-10-CM

## 2024-03-26 ENCOUNTER — HOSPITAL ENCOUNTER (OUTPATIENT)
Dept: GENERAL RADIOLOGY | Facility: HOSPITAL | Age: 39
Discharge: HOME | End: 2024-03-26
Admitting: ORTHOPAEDIC SURGERY
Payer: COMMERCIAL

## 2024-03-26 ENCOUNTER — TRANSCRIBE ORDERS (OUTPATIENT)
Dept: ADMINISTRATIVE | Facility: HOSPITAL | Age: 39
End: 2024-03-26
Payer: COMMERCIAL

## 2024-03-26 DIAGNOSIS — M51.36 DDD (DEGENERATIVE DISC DISEASE), LUMBAR: Primary | ICD-10-CM

## 2024-03-26 DIAGNOSIS — M51.36 DDD (DEGENERATIVE DISC DISEASE), LUMBAR: ICD-10-CM

## 2024-03-26 PROCEDURE — 72110 X-RAY EXAM L-2 SPINE 4/>VWS: CPT
